# Patient Record
Sex: MALE | Race: WHITE | NOT HISPANIC OR LATINO | Employment: FULL TIME | URBAN - METROPOLITAN AREA
[De-identification: names, ages, dates, MRNs, and addresses within clinical notes are randomized per-mention and may not be internally consistent; named-entity substitution may affect disease eponyms.]

---
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
## 2018-01-02 ENCOUNTER — TELEPHONE (OUTPATIENT)
Dept: PEDIATRICS | Facility: CLINIC | Age: 18
End: 2018-01-02

## 2018-01-02 DIAGNOSIS — R05.9 COUGH: Primary | ICD-10-CM

## 2018-01-02 RX ORDER — ALBUTEROL SULFATE 90 UG/1
2 AEROSOL, METERED RESPIRATORY (INHALATION) EVERY 4 HOURS PRN
Qty: 1 INHALER | Refills: 0 | Status: SHIPPED | OUTPATIENT
Start: 2018-01-02 | End: 2021-03-12

## 2018-01-02 NOTE — TELEPHONE ENCOUNTER
----- Message from Mali Baer sent at 1/2/2018  4:16 PM CST -----  Contact: Mom [phone]  Mom would like to know if Dr. Canales can prescribe Qvar? Mom says he has a terrible cough. Please call and advise.

## 2018-01-02 NOTE — TELEPHONE ENCOUNTER
Spoke with mom and darin. Will resume use of albuterol for short term. Q jorje not likely to help acutely. Cough now for last 2 months. Mom did treat with zithromax. Also discussed possibility of pertussis and cough cough for next month. If not improving see [place] md.

## 2018-01-02 NOTE — TELEPHONE ENCOUNTER
Mom states that pt is home from college. Pt has been coughing,nothing severe, no fever. Given cough medication, reflux medication. Lungs are clear. zpack for 5 days, did not help. Inhaler is . Mom is wanting to know if you can call in another refill until he goes back to college and can see a doctor. Please advise. Allergies/ pharmacy verified.

## 2018-05-10 ENCOUNTER — OFFICE VISIT (OUTPATIENT)
Dept: DERMATOLOGY | Facility: CLINIC | Age: 18
End: 2018-05-10
Payer: COMMERCIAL

## 2018-05-10 DIAGNOSIS — B07.8 VERRUCA PLANA: Primary | ICD-10-CM

## 2018-05-10 PROCEDURE — 99499 UNLISTED E&M SERVICE: CPT | Mod: S$GLB,,, | Performed by: NURSE PRACTITIONER

## 2018-05-10 PROCEDURE — 17110 DESTRUCTION B9 LES UP TO 14: CPT | Mod: S$GLB,,, | Performed by: NURSE PRACTITIONER

## 2018-05-10 PROCEDURE — 99999 PR PBB SHADOW E&M-EST. PATIENT-LVL II: CPT | Mod: PBBFAC,,, | Performed by: NURSE PRACTITIONER

## 2018-05-10 NOTE — PROGRESS NOTES
Subjective:       Patient ID:  Henry Pham is a 17 y.o. male who presents for   Chief Complaint   Patient presents with    Warts     Warts  - Initial  Affected locations: right knee  Duration: 2 months  Signs / symptoms: dryness  Timing: constant  Aggravated by: nothing  Relieving factors/Treatments tried: nothing  Improvement on treatment: no relief        Review of Systems   Constitutional: Negative for fever and chills.   Skin: Positive for activity-related sunscreen use. Negative for daily sunscreen use.   Hematologic/Lymphatic: Does not bruise/bleed easily.        Objective:    Physical Exam   Constitutional: He appears well-developed and well-nourished.   Neurological: He is alert and oriented to person, place, and time.   Psychiatric: He has a normal mood and affect.   Skin:   Areas Examined (abnormalities noted in diagram):   RLE Inspected  LLE Inspection Performed              Diagram Legend     Erythematous scaling macule/papule c/w actinic keratosis       Vascular papule c/w angioma      Pigmented verrucoid papule/plaque c/w seborrheic keratosis      Yellow umbilicated papule c/w sebaceous hyperplasia      Irregularly shaped tan macule c/w lentigo     1-2 mm smooth white papules consistent with Milia      Movable subcutaneous cyst with punctum c/w epidermal inclusion cyst      Subcutaneous movable cyst c/w pilar cyst      Firm pink to brown papule c/w dermatofibroma      Pedunculated fleshy papule(s) c/w skin tag(s)      Evenly pigmented macule c/w junctional nevus     Mildly variegated pigmented, slightly irregular-bordered macule c/w mildly atypical nevus      Flesh colored to evenly pigmented papule c/w intradermal nevus       Pink pearly papule/plaque c/w basal cell carcinoma      Erythematous hyperkeratotic cursted plaque c/w SCC      Surgical scar with no sign of skin cancer recurrence      Open and closed comedones      Inflammatory papules and pustules      Verrucoid papule consistent  consistent with wart     Erythematous eczematous patches and plaques     Dystrophic onycholytic nail with subungual debris c/w onychomycosis     Umbilicated papule    Erythematous-base heme-crusted tan verrucoid plaque consistent with inflamed seborrheic keratosis     Erythematous Silvery Scaling Plaque c/w Psoriasis     See annotation      Assessment / Plan:        Verruca plana- right knee  Procedure note for destruction via shave debulking:    Discussed risks of procedure including but not limited to infection, persistence of lesion, recurrence of lesion, and scar. Verbal consent obtained. Area cleaned with alcohol and anesthetized with 1% lidocaine with epinephrine. 2 Lesion(s) shaved with sharp razor then base destroyed with hyfrecation. No complications.             Follow-up if symptoms worsen or fail to improve.

## 2018-07-03 ENCOUNTER — OFFICE VISIT (OUTPATIENT)
Dept: OPTOMETRY | Facility: CLINIC | Age: 18
End: 2018-07-03
Payer: COMMERCIAL

## 2018-07-03 DIAGNOSIS — H10.212 CHEMICAL CONJUNCTIVITIS OF LEFT EYE: Primary | ICD-10-CM

## 2018-07-03 PROCEDURE — 99999 PR PBB SHADOW E&M-EST. PATIENT-LVL III: CPT | Mod: PBBFAC,,, | Performed by: OPTOMETRIST

## 2018-07-03 PROCEDURE — 92012 INTRM OPH EXAM EST PATIENT: CPT | Mod: S$GLB,,, | Performed by: OPTOMETRIST

## 2018-07-03 RX ORDER — TOBRAMYCIN AND DEXAMETHASONE 3; 1 MG/ML; MG/ML
1 SUSPENSION/ DROPS OPHTHALMIC 4 TIMES DAILY
Qty: 5 ML | Refills: 0 | Status: SHIPPED | OUTPATIENT
Start: 2018-07-03 | End: 2018-07-10

## 2018-07-03 NOTE — PROGRESS NOTES
HPI     DLS: 6/2/2017 with Dr. Cherry   Pt states last night OS was red with mucous discharge. Pt states this   morning redness is light and still have mucous discharge. No problems OD  Denies itching, crusting, and pain. Pt states no va changes    Zaditor ou qhs/bid    Pt is a CL wearer, but did not sleep in CL     Last edited by Tao Li, OD on 7/3/2018 11:18 AM. (History)        ROS     Negative for: Constitutional, Gastrointestinal, Neurological, Skin,   Genitourinary, Musculoskeletal, HENT, Endocrine, Cardiovascular, Eyes,   Respiratory, Psychiatric, Allergic/Imm, Heme/Lymph    Last edited by Tao Li, OD on 7/3/2018 11:18 AM. (History)        Assessment /Plan     For exam results, see Encounter Report.    Chemical conjunctivitis of left eye  -     tobramycin-dexamethasone 0.3-0.1% (TOBRADEX) 0.3-0.1 % DrpS; Place 1 drop into the left eye 4 (four) times daily. for 7 days  Dispense: 5 mL; Refill: 0      1 day hx conj OS.  Pt does wear DAILY DISP CLs a few days a week, but did not wear yetserday or today.  Does not sleep in Cls.  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 TOBRADEX QID OS X 1 week  2. DC CL wear  3. Pt will call if sx persist w tx.  Otherwise may stop drops and return to CL wear when eye is better, and return to Dr cherry as sched for full exam/CLFU  4. Mom present for exam--she is NP who works with Dr Kita Taylor

## 2018-08-13 ENCOUNTER — OFFICE VISIT (OUTPATIENT)
Dept: OPTOMETRY | Facility: CLINIC | Age: 18
End: 2018-08-13
Payer: COMMERCIAL

## 2018-08-13 DIAGNOSIS — Z46.0 FITTING AND ADJUSTMENT OF SPECTACLES AND CONTACT LENSES: ICD-10-CM

## 2018-08-13 DIAGNOSIS — H52.13 MYOPIA OF BOTH EYES: Primary | ICD-10-CM

## 2018-08-13 DIAGNOSIS — H52.13 MYOPIA, BILATERAL: Primary | ICD-10-CM

## 2018-08-13 PROCEDURE — 92015 DETERMINE REFRACTIVE STATE: CPT | Mod: S$GLB,,, | Performed by: OPTOMETRIST

## 2018-08-13 PROCEDURE — 92310 CONTACT LENS FITTING OU: CPT | Mod: S$GLB,,, | Performed by: OPTOMETRIST

## 2018-08-13 PROCEDURE — 99999 PR PBB SHADOW E&M-EST. PATIENT-LVL II: CPT | Mod: PBBFAC,,, | Performed by: OPTOMETRIST

## 2018-08-13 PROCEDURE — 92014 COMPRE OPH EXAM EST PT 1/>: CPT | Mod: S$GLB,,, | Performed by: OPTOMETRIST

## 2018-08-13 NOTE — PROGRESS NOTES
HPI     Pt states that his vision seems to be stable for distance and near since   last exam. Pt denies any signs of flashes or floaters OU at this time. No   pain or discomfort OU. Pt will use zaditor if needed OU. Pt does wear and   believes he sees better when he wears his contacts.     Last edited by Maye Hyde on 8/13/2018  1:02 PM. (History)            Assessment /Plan     For exam results, see Encounter Report.    Myopia, bilateral  Eyeglass Final Rx     Eyeglass Final Rx       Sphere Cylinder Dist VA    Right -2.75 Sphere 20/20    Left -2.75 Sphere 20/20    Type:  SVL    Expiration Date:  8/14/2019              Contact Lens Final Rx     Final Contact Lens Rx       Brand Base Curve Diameter Sphere Cylinder    Right Acuvue 1 Day Moist 8.50 14.2 -2.75 Sphere    Left Acuvue 1 Day Moist 8.50 14.2 -2.75 Sphere    Expiration Date:  8/14/2019    Replacement:  Daily    Wearing Schedule:  Daily wear                  RTC 1 yr

## 2018-11-02 ENCOUNTER — OFFICE VISIT (OUTPATIENT)
Dept: INTERNAL MEDICINE | Facility: CLINIC | Age: 18
End: 2018-11-02
Payer: COMMERCIAL

## 2018-11-02 VITALS
BODY MASS INDEX: 32.25 KG/M2 | DIASTOLIC BLOOD PRESSURE: 88 MMHG | WEIGHT: 238.13 LBS | TEMPERATURE: 98 F | SYSTOLIC BLOOD PRESSURE: 126 MMHG | HEIGHT: 72 IN | HEART RATE: 84 BPM

## 2018-11-02 DIAGNOSIS — R05.3 CHRONIC COUGH: ICD-10-CM

## 2018-11-02 DIAGNOSIS — E66.9 OBESITY (BMI 30-39.9): ICD-10-CM

## 2018-11-02 DIAGNOSIS — Z00.00 ANNUAL PHYSICAL EXAM: Primary | ICD-10-CM

## 2018-11-02 DIAGNOSIS — R09.82 POST-NASAL DRIP: ICD-10-CM

## 2018-11-02 PROCEDURE — 99385 PREV VISIT NEW AGE 18-39: CPT | Mod: S$GLB,,, | Performed by: INTERNAL MEDICINE

## 2018-11-02 PROCEDURE — 99999 PR PBB SHADOW E&M-EST. PATIENT-LVL III: CPT | Mod: PBBFAC,,, | Performed by: INTERNAL MEDICINE

## 2018-11-02 RX ORDER — FLUTICASONE PROPIONATE 50 MCG
2 SPRAY, SUSPENSION (ML) NASAL DAILY
Qty: 16 G | Refills: 12 | Status: SHIPPED | OUTPATIENT
Start: 2018-11-02 | End: 2018-12-02

## 2018-11-02 RX ORDER — LEVOCETIRIZINE DIHYDROCHLORIDE 5 MG/1
5 TABLET, FILM COATED ORAL NIGHTLY
Qty: 30 TABLET | Refills: 11 | Status: SHIPPED | OUTPATIENT
Start: 2018-11-02 | End: 2021-03-12

## 2018-11-02 NOTE — PROGRESS NOTES
"Subjective:       Patient ID: Henry Pham is a 18 y.o. male.    Chief Complaint: Annual Exam (est care;not fasting)    HPI   18 y.o. Male here for annual exam.     Vaccines: Influenza (2018); Tetanus (2009)  Eye exam: 2018    Exercise: no  Diet: regular    Past Medical History:  No date: Allergy      Comment:  seasonal  No date: Asthma, not well controlled  2004: Basal cell carcinoma      Comment:  right shoulder  No date: Constipation - functional  No date: Cough  No date: Obesity (BMI 30-39.9)  No date: Wheezing  Past Surgical History:  No date: TONSILLECTOMY  Social History    Socioeconomic History      Marital status: Single      Spouse name: Not on file      Number of children: Not on file      Years of education: Not on file      Highest education level: Not on file    Social Needs      Financial resource strain: Not on file      Food insecurity - worry: Not on file      Food insecurity - inability: Not on file      Transportation needs - medical: Not on file      Transportation needs - non-medical: Not on file    Occupational History      Not on file    Tobacco Use      Smoking status: Never Smoker      Smokeless tobacco: Never Used    Substance and Sexual Activity      Alcohol use: No      Drug use: No      Sexual activity: Not Currently        Partners: Female    Other Topics      Concerns:        Not on file    Social History Narrative      Preferred name "Marin"      Lives with mom (Dora) and step dad.  Student at [employer]        Review of patient's allergies indicates:  No Known Allergies  Marin Pham had no medications administered during this visit.    Review of Systems   Constitutional: Negative for activity change, appetite change, chills, diaphoresis, fatigue, fever and unexpected weight change.   HENT: Negative for congestion, mouth sores, postnasal drip, rhinorrhea, sinus pressure, sneezing, sore throat, trouble swallowing and voice change.    Eyes: Negative for discharge, itching and " visual disturbance.   Respiratory: Negative for cough, chest tightness, shortness of breath and wheezing.    Cardiovascular: Negative for chest pain, palpitations and leg swelling.   Gastrointestinal: Negative for abdominal pain, blood in stool, constipation, diarrhea, nausea and vomiting.   Endocrine: Negative for cold intolerance and heat intolerance.   Genitourinary: Negative for difficulty urinating, dysuria, flank pain, hematuria and urgency.   Musculoskeletal: Negative for arthralgias, back pain, myalgias and neck pain.   Skin: Negative for rash and wound.   Allergic/Immunologic: Negative for environmental allergies and food allergies.   Neurological: Negative for dizziness, tremors, seizures, syncope, weakness and headaches.   Hematological: Negative for adenopathy. Does not bruise/bleed easily.   Psychiatric/Behavioral: Negative for confusion, sleep disturbance and suicidal ideas. The patient is not nervous/anxious.        Objective:      Physical Exam   Constitutional: He is oriented to person, place, and time. He appears well-developed and well-nourished. No distress.   HENT:   Head: Normocephalic and atraumatic.   Right Ear: External ear normal.   Left Ear: External ear normal.   Nose: Nose normal.   Mouth/Throat: Oropharynx is clear and moist. No oropharyngeal exudate.   Eyes: Conjunctivae and EOM are normal. Pupils are equal, round, and reactive to light. Right eye exhibits no discharge. Left eye exhibits no discharge. No scleral icterus.   Neck: Normal range of motion. Neck supple. No JVD present. No thyromegaly present.   Cardiovascular: Normal rate, regular rhythm, normal heart sounds and intact distal pulses.   No murmur heard.  Pulmonary/Chest: Effort normal and breath sounds normal. No respiratory distress. He has no wheezes. He has no rales.   Abdominal: Soft. Bowel sounds are normal. He exhibits no distension. There is no tenderness. There is no guarding.   Musculoskeletal: He exhibits no edema.    Lymphadenopathy:     He has no cervical adenopathy.   Neurological: He is alert and oriented to person, place, and time. No cranial nerve deficit. Coordination normal.   Skin: Skin is warm and dry. No rash noted. He is not diaphoretic. No pallor.   Psychiatric: He has a normal mood and affect. Judgment normal.       Assessment:       1. Annual physical exam    2. Obesity (BMI 30-39.9)    3. Post-nasal drip    4. Chronic cough        Plan:    1. Blood work ordered        Vaccines: Influenza (2018); Tetanus (2009)       Eye exam: 2018   2. Pt advised on proper diet/exercise for weight loss   3/4. Rx Xyzal/Flonase          If no improvement in the cough, will refer to Pulm    5. F/u in 1 yr

## 2018-11-09 ENCOUNTER — LAB VISIT (OUTPATIENT)
Dept: LAB | Facility: HOSPITAL | Age: 18
End: 2018-11-09
Attending: INTERNAL MEDICINE
Payer: COMMERCIAL

## 2018-11-09 DIAGNOSIS — Z00.00 ANNUAL PHYSICAL EXAM: ICD-10-CM

## 2018-11-09 LAB
ALBUMIN SERPL BCP-MCNC: 4 G/DL
ALP SERPL-CCNC: 79 U/L
ALT SERPL W/O P-5'-P-CCNC: 39 U/L
ANION GAP SERPL CALC-SCNC: 10 MMOL/L
AST SERPL-CCNC: 23 U/L
BASOPHILS # BLD AUTO: 0.07 K/UL
BASOPHILS NFR BLD: 0.7 %
BILIRUB SERPL-MCNC: 0.7 MG/DL
BUN SERPL-MCNC: 12 MG/DL
CALCIUM SERPL-MCNC: 10.1 MG/DL
CHLORIDE SERPL-SCNC: 105 MMOL/L
CHOLEST SERPL-MCNC: 160 MG/DL
CHOLEST/HDLC SERPL: 4.6 {RATIO}
CO2 SERPL-SCNC: 27 MMOL/L
CREAT SERPL-MCNC: 1 MG/DL
DIFFERENTIAL METHOD: ABNORMAL
EOSINOPHIL # BLD AUTO: 0.1 K/UL
EOSINOPHIL NFR BLD: 0.9 %
ERYTHROCYTE [DISTWIDTH] IN BLOOD BY AUTOMATED COUNT: 12.8 %
EST. GFR  (AFRICAN AMERICAN): >60 ML/MIN/1.73 M^2
EST. GFR  (NON AFRICAN AMERICAN): >60 ML/MIN/1.73 M^2
GLUCOSE SERPL-MCNC: 90 MG/DL
HCT VFR BLD AUTO: 47.2 %
HDLC SERPL-MCNC: 35 MG/DL
HDLC SERPL: 21.9 %
HGB BLD-MCNC: 15.1 G/DL
IMM GRANULOCYTES # BLD AUTO: 0.06 K/UL
IMM GRANULOCYTES NFR BLD AUTO: 0.6 %
LDLC SERPL CALC-MCNC: 92 MG/DL
LYMPHOCYTES # BLD AUTO: 3.7 K/UL
LYMPHOCYTES NFR BLD: 34.7 %
MCH RBC QN AUTO: 27.6 PG
MCHC RBC AUTO-ENTMCNC: 32 G/DL
MCV RBC AUTO: 86 FL
MONOCYTES # BLD AUTO: 1.1 K/UL
MONOCYTES NFR BLD: 10.6 %
NEUTROPHILS # BLD AUTO: 5.6 K/UL
NEUTROPHILS NFR BLD: 52.5 %
NONHDLC SERPL-MCNC: 125 MG/DL
NRBC BLD-RTO: 0 /100 WBC
PLATELET # BLD AUTO: 257 K/UL
PMV BLD AUTO: 12.4 FL
POTASSIUM SERPL-SCNC: 4.3 MMOL/L
PROT SERPL-MCNC: 7 G/DL
RBC # BLD AUTO: 5.48 M/UL
SODIUM SERPL-SCNC: 142 MMOL/L
T4 FREE SERPL-MCNC: 0.94 NG/DL
TRIGL SERPL-MCNC: 165 MG/DL
TSH SERPL DL<=0.005 MIU/L-ACNC: 4.42 UIU/ML
WBC # BLD AUTO: 10.59 K/UL

## 2018-11-09 PROCEDURE — 84443 ASSAY THYROID STIM HORMONE: CPT

## 2018-11-09 PROCEDURE — 80053 COMPREHEN METABOLIC PANEL: CPT

## 2018-11-09 PROCEDURE — 80061 LIPID PANEL: CPT

## 2018-11-09 PROCEDURE — 36415 COLL VENOUS BLD VENIPUNCTURE: CPT | Mod: PO

## 2018-11-09 PROCEDURE — 84439 ASSAY OF FREE THYROXINE: CPT

## 2018-11-09 PROCEDURE — 85025 COMPLETE CBC W/AUTO DIFF WBC: CPT

## 2021-01-25 ENCOUNTER — OCCUPATIONAL HEALTH (OUTPATIENT)
Dept: URGENT CARE | Facility: CLINIC | Age: 21
End: 2021-01-25

## 2021-01-25 VITALS — OXYGEN SATURATION: 97 % | HEART RATE: 91 BPM | TEMPERATURE: 97 F

## 2021-01-25 DIAGNOSIS — Z11.59 ENCOUNTER FOR SCREENING FOR OTHER VIRAL DISEASES: Primary | ICD-10-CM

## 2021-01-25 LAB
CTP QC/QA: YES
SARS-COV-2 RDRP RESP QL NAA+PROBE: NEGATIVE

## 2021-01-25 PROCEDURE — 99199 UNLISTED SPECIAL SVC PX/RPRT: CPT | Mod: S$GLB,,, | Performed by: STUDENT IN AN ORGANIZED HEALTH CARE EDUCATION/TRAINING PROGRAM

## 2021-01-25 PROCEDURE — U0002 COVID-19 LAB TEST NON-CDC: HCPCS | Mod: QW,S$GLB,, | Performed by: STUDENT IN AN ORGANIZED HEALTH CARE EDUCATION/TRAINING PROGRAM

## 2021-01-25 PROCEDURE — 99199 CV19 SPECIMEN HANDLING FEE / SWAB: ICD-10-PCS | Mod: S$GLB,,, | Performed by: STUDENT IN AN ORGANIZED HEALTH CARE EDUCATION/TRAINING PROGRAM

## 2021-01-25 PROCEDURE — U0002: ICD-10-PCS | Mod: QW,S$GLB,, | Performed by: STUDENT IN AN ORGANIZED HEALTH CARE EDUCATION/TRAINING PROGRAM

## 2021-03-12 ENCOUNTER — PATIENT MESSAGE (OUTPATIENT)
Dept: INTERNAL MEDICINE | Facility: CLINIC | Age: 21
End: 2021-03-12

## 2021-03-12 ENCOUNTER — OFFICE VISIT (OUTPATIENT)
Dept: INTERNAL MEDICINE | Facility: CLINIC | Age: 21
End: 2021-03-12
Payer: COMMERCIAL

## 2021-03-12 ENCOUNTER — LAB VISIT (OUTPATIENT)
Dept: LAB | Facility: HOSPITAL | Age: 21
End: 2021-03-12
Attending: INTERNAL MEDICINE
Payer: COMMERCIAL

## 2021-03-12 VITALS
SYSTOLIC BLOOD PRESSURE: 122 MMHG | DIASTOLIC BLOOD PRESSURE: 64 MMHG | RESPIRATION RATE: 14 BRPM | HEART RATE: 70 BPM | WEIGHT: 226.88 LBS | TEMPERATURE: 97 F | HEIGHT: 72 IN | BODY MASS INDEX: 30.73 KG/M2

## 2021-03-12 DIAGNOSIS — Z00.00 ANNUAL PHYSICAL EXAM: Primary | ICD-10-CM

## 2021-03-12 DIAGNOSIS — Z00.00 ANNUAL PHYSICAL EXAM: ICD-10-CM

## 2021-03-12 LAB
ALBUMIN SERPL BCP-MCNC: 4.2 G/DL (ref 3.5–5.2)
ALP SERPL-CCNC: 100 U/L (ref 55–135)
ALT SERPL W/O P-5'-P-CCNC: 20 U/L (ref 10–44)
ANION GAP SERPL CALC-SCNC: 10 MMOL/L (ref 8–16)
AST SERPL-CCNC: 16 U/L (ref 10–40)
BASOPHILS # BLD AUTO: 0.03 K/UL (ref 0–0.2)
BASOPHILS NFR BLD: 0.4 % (ref 0–1.9)
BILIRUB SERPL-MCNC: 0.5 MG/DL (ref 0.1–1)
BUN SERPL-MCNC: 20 MG/DL (ref 6–20)
CALCIUM SERPL-MCNC: 9.6 MG/DL (ref 8.7–10.5)
CHLORIDE SERPL-SCNC: 106 MMOL/L (ref 95–110)
CHOLEST SERPL-MCNC: 138 MG/DL (ref 120–199)
CHOLEST/HDLC SERPL: 4.2 {RATIO} (ref 2–5)
CO2 SERPL-SCNC: 25 MMOL/L (ref 23–29)
CREAT SERPL-MCNC: 1 MG/DL (ref 0.5–1.4)
DIFFERENTIAL METHOD: NORMAL
EOSINOPHIL # BLD AUTO: 0.1 K/UL (ref 0–0.5)
EOSINOPHIL NFR BLD: 1.2 % (ref 0–8)
ERYTHROCYTE [DISTWIDTH] IN BLOOD BY AUTOMATED COUNT: 13.3 % (ref 11.5–14.5)
EST. GFR  (AFRICAN AMERICAN): >60 ML/MIN/1.73 M^2
EST. GFR  (NON AFRICAN AMERICAN): >60 ML/MIN/1.73 M^2
GLUCOSE SERPL-MCNC: 90 MG/DL (ref 70–110)
HCT VFR BLD AUTO: 45.9 % (ref 40–54)
HDLC SERPL-MCNC: 33 MG/DL (ref 40–75)
HDLC SERPL: 23.9 % (ref 20–50)
HGB BLD-MCNC: 15.1 G/DL (ref 14–18)
IMM GRANULOCYTES # BLD AUTO: 0.02 K/UL (ref 0–0.04)
IMM GRANULOCYTES NFR BLD AUTO: 0.2 % (ref 0–0.5)
LDLC SERPL CALC-MCNC: 83.2 MG/DL (ref 63–159)
LYMPHOCYTES # BLD AUTO: 2.5 K/UL (ref 1–4.8)
LYMPHOCYTES NFR BLD: 30.8 % (ref 18–48)
MCH RBC QN AUTO: 27.7 PG (ref 27–31)
MCHC RBC AUTO-ENTMCNC: 32.9 G/DL (ref 32–36)
MCV RBC AUTO: 84 FL (ref 82–98)
MONOCYTES # BLD AUTO: 0.9 K/UL (ref 0.3–1)
MONOCYTES NFR BLD: 10.5 % (ref 4–15)
NEUTROPHILS # BLD AUTO: 4.6 K/UL (ref 1.8–7.7)
NEUTROPHILS NFR BLD: 56.9 % (ref 38–73)
NONHDLC SERPL-MCNC: 105 MG/DL
NRBC BLD-RTO: 0 /100 WBC
PLATELET # BLD AUTO: 244 K/UL (ref 150–350)
PMV BLD AUTO: 12.2 FL (ref 9.2–12.9)
POTASSIUM SERPL-SCNC: 4 MMOL/L (ref 3.5–5.1)
PROT SERPL-MCNC: 7 G/DL (ref 6–8.4)
RBC # BLD AUTO: 5.46 M/UL (ref 4.6–6.2)
SODIUM SERPL-SCNC: 141 MMOL/L (ref 136–145)
T4 FREE SERPL-MCNC: 0.91 NG/DL (ref 0.71–1.51)
TRIGL SERPL-MCNC: 109 MG/DL (ref 30–150)
TSH SERPL DL<=0.005 MIU/L-ACNC: 6.33 UIU/ML (ref 0.4–4)
WBC # BLD AUTO: 8.06 K/UL (ref 3.9–12.7)

## 2021-03-12 PROCEDURE — 85025 COMPLETE CBC W/AUTO DIFF WBC: CPT | Performed by: INTERNAL MEDICINE

## 2021-03-12 PROCEDURE — 84439 ASSAY OF FREE THYROXINE: CPT | Performed by: INTERNAL MEDICINE

## 2021-03-12 PROCEDURE — 80061 LIPID PANEL: CPT | Performed by: INTERNAL MEDICINE

## 2021-03-12 PROCEDURE — 99395 PREV VISIT EST AGE 18-39: CPT | Mod: S$GLB,,, | Performed by: INTERNAL MEDICINE

## 2021-03-12 PROCEDURE — 99395 PR PREVENTIVE VISIT,EST,18-39: ICD-10-PCS | Mod: S$GLB,,, | Performed by: INTERNAL MEDICINE

## 2021-03-12 PROCEDURE — 99213 OFFICE O/P EST LOW 20 MIN: CPT | Mod: PBBFAC,PO | Performed by: INTERNAL MEDICINE

## 2021-03-12 PROCEDURE — 80053 COMPREHEN METABOLIC PANEL: CPT | Performed by: INTERNAL MEDICINE

## 2021-03-12 PROCEDURE — 99999 PR PBB SHADOW E&M-EST. PATIENT-LVL III: CPT | Mod: PBBFAC,,, | Performed by: INTERNAL MEDICINE

## 2021-03-12 PROCEDURE — 99999 PR PBB SHADOW E&M-EST. PATIENT-LVL III: ICD-10-PCS | Mod: PBBFAC,,, | Performed by: INTERNAL MEDICINE

## 2021-03-12 PROCEDURE — 84443 ASSAY THYROID STIM HORMONE: CPT | Performed by: INTERNAL MEDICINE

## 2021-03-12 PROCEDURE — 36415 COLL VENOUS BLD VENIPUNCTURE: CPT | Performed by: INTERNAL MEDICINE

## 2021-03-15 ENCOUNTER — TELEPHONE (OUTPATIENT)
Dept: INTERNAL MEDICINE | Facility: CLINIC | Age: 21
End: 2021-03-15

## 2021-03-15 DIAGNOSIS — E03.9 HYPOTHYROIDISM, UNSPECIFIED TYPE: Primary | ICD-10-CM

## 2021-03-15 RX ORDER — LEVOTHYROXINE SODIUM 50 UG/1
50 TABLET ORAL
Qty: 30 TABLET | Refills: 1 | Status: SHIPPED | OUTPATIENT
Start: 2021-03-15 | End: 2021-04-12 | Stop reason: SDUPTHER

## 2021-03-24 ENCOUNTER — IMMUNIZATION (OUTPATIENT)
Dept: PRIMARY CARE CLINIC | Facility: CLINIC | Age: 21
End: 2021-03-24
Payer: COMMERCIAL

## 2021-03-24 DIAGNOSIS — Z23 NEED FOR VACCINATION: Primary | ICD-10-CM

## 2021-03-24 PROCEDURE — 0001A COVID-19, MRNA, LNP-S, PF, 30 MCG/0.3 ML DOSE VACCINE: ICD-10-PCS | Mod: CV19,S$GLB,, | Performed by: INTERNAL MEDICINE

## 2021-03-24 PROCEDURE — 91300 COVID-19, MRNA, LNP-S, PF, 30 MCG/0.3 ML DOSE VACCINE: ICD-10-PCS | Mod: S$GLB,,, | Performed by: INTERNAL MEDICINE

## 2021-03-24 PROCEDURE — 0001A COVID-19, MRNA, LNP-S, PF, 30 MCG/0.3 ML DOSE VACCINE: CPT | Mod: CV19,S$GLB,, | Performed by: INTERNAL MEDICINE

## 2021-03-24 PROCEDURE — 91300 COVID-19, MRNA, LNP-S, PF, 30 MCG/0.3 ML DOSE VACCINE: CPT | Mod: S$GLB,,, | Performed by: INTERNAL MEDICINE

## 2021-04-12 RX ORDER — LEVOTHYROXINE SODIUM 50 UG/1
50 TABLET ORAL
Qty: 30 TABLET | Refills: 0 | Status: SHIPPED | OUTPATIENT
Start: 2021-04-12 | End: 2021-05-02 | Stop reason: SDUPTHER

## 2021-04-14 ENCOUNTER — IMMUNIZATION (OUTPATIENT)
Dept: PRIMARY CARE CLINIC | Facility: CLINIC | Age: 21
End: 2021-04-14
Payer: COMMERCIAL

## 2021-04-14 DIAGNOSIS — Z23 NEED FOR VACCINATION: Primary | ICD-10-CM

## 2021-04-14 PROCEDURE — 91300 COVID-19, MRNA, LNP-S, PF, 30 MCG/0.3 ML DOSE VACCINE: CPT | Mod: S$GLB,,, | Performed by: INTERNAL MEDICINE

## 2021-04-14 PROCEDURE — 0002A COVID-19, MRNA, LNP-S, PF, 30 MCG/0.3 ML DOSE VACCINE: ICD-10-PCS | Mod: CV19,S$GLB,, | Performed by: INTERNAL MEDICINE

## 2021-04-14 PROCEDURE — 0002A COVID-19, MRNA, LNP-S, PF, 30 MCG/0.3 ML DOSE VACCINE: CPT | Mod: CV19,S$GLB,, | Performed by: INTERNAL MEDICINE

## 2021-04-14 PROCEDURE — 91300 COVID-19, MRNA, LNP-S, PF, 30 MCG/0.3 ML DOSE VACCINE: ICD-10-PCS | Mod: S$GLB,,, | Performed by: INTERNAL MEDICINE

## 2021-04-26 ENCOUNTER — LAB VISIT (OUTPATIENT)
Dept: LAB | Facility: HOSPITAL | Age: 21
End: 2021-04-26
Attending: INTERNAL MEDICINE
Payer: COMMERCIAL

## 2021-04-26 DIAGNOSIS — E03.9 HYPOTHYROIDISM, UNSPECIFIED TYPE: ICD-10-CM

## 2021-04-26 LAB
T4 FREE SERPL-MCNC: 0.99 NG/DL (ref 0.71–1.51)
TSH SERPL DL<=0.005 MIU/L-ACNC: 3.25 UIU/ML (ref 0.4–4)

## 2021-04-26 PROCEDURE — 84439 ASSAY OF FREE THYROXINE: CPT | Performed by: INTERNAL MEDICINE

## 2021-04-26 PROCEDURE — 84443 ASSAY THYROID STIM HORMONE: CPT | Performed by: INTERNAL MEDICINE

## 2021-04-26 PROCEDURE — 36415 COLL VENOUS BLD VENIPUNCTURE: CPT | Mod: PO | Performed by: INTERNAL MEDICINE

## 2021-04-27 ENCOUNTER — TELEPHONE (OUTPATIENT)
Dept: INTERNAL MEDICINE | Facility: CLINIC | Age: 21
End: 2021-04-27

## 2021-05-03 RX ORDER — LEVOTHYROXINE SODIUM 50 UG/1
50 TABLET ORAL
Qty: 90 TABLET | Refills: 3 | Status: SHIPPED | OUTPATIENT
Start: 2021-05-03 | End: 2021-08-09 | Stop reason: SDUPTHER

## 2021-08-10 RX ORDER — LEVOTHYROXINE SODIUM 50 UG/1
50 TABLET ORAL
Qty: 90 TABLET | Refills: 3 | Status: SHIPPED | OUTPATIENT
Start: 2021-08-10 | End: 2021-11-09 | Stop reason: SDUPTHER

## 2021-09-30 ENCOUNTER — OFFICE VISIT (OUTPATIENT)
Dept: OPTOMETRY | Facility: CLINIC | Age: 21
End: 2021-09-30
Payer: COMMERCIAL

## 2021-09-30 DIAGNOSIS — H52.13 MYOPIA, BILATERAL: Primary | ICD-10-CM

## 2021-09-30 PROBLEM — Z97.3 WEARS CONTACT LENSES: Status: ACTIVE | Noted: 2021-09-30

## 2021-09-30 PROCEDURE — 92004 COMPRE OPH EXAM NEW PT 1/>: CPT | Mod: ,,, | Performed by: OPTOMETRIST

## 2021-09-30 PROCEDURE — 99999 PR PBB SHADOW E&M-EST. PATIENT-LVL II: CPT | Mod: PBBFAC,,, | Performed by: OPTOMETRIST

## 2021-09-30 PROCEDURE — 99999 PR PBB SHADOW E&M-EST. PATIENT-LVL II: ICD-10-PCS | Mod: PBBFAC,,, | Performed by: OPTOMETRIST

## 2021-09-30 PROCEDURE — 92015 PR REFRACTION: ICD-10-PCS | Mod: S$GLB,,, | Performed by: OPTOMETRIST

## 2021-09-30 PROCEDURE — 92004 PR EYE EXAM, NEW PATIENT,COMPREHESV: ICD-10-PCS | Mod: ,,, | Performed by: OPTOMETRIST

## 2021-09-30 PROCEDURE — 92015 DETERMINE REFRACTIVE STATE: CPT | Mod: S$GLB,,, | Performed by: OPTOMETRIST

## 2021-11-09 RX ORDER — LEVOTHYROXINE SODIUM 50 UG/1
50 TABLET ORAL
Qty: 90 TABLET | Refills: 3 | Status: SHIPPED | OUTPATIENT
Start: 2021-11-09 | End: 2021-11-29

## 2021-11-27 ENCOUNTER — PATIENT MESSAGE (OUTPATIENT)
Dept: INTERNAL MEDICINE | Facility: CLINIC | Age: 21
End: 2021-11-27

## 2021-11-27 ENCOUNTER — LAB VISIT (OUTPATIENT)
Dept: LAB | Facility: HOSPITAL | Age: 21
End: 2021-11-27
Attending: INTERNAL MEDICINE
Payer: COMMERCIAL

## 2021-11-27 ENCOUNTER — OFFICE VISIT (OUTPATIENT)
Dept: INTERNAL MEDICINE | Facility: CLINIC | Age: 21
End: 2021-11-27
Payer: COMMERCIAL

## 2021-11-27 VITALS
HEART RATE: 69 BPM | BODY MASS INDEX: 27.86 KG/M2 | OXYGEN SATURATION: 99 % | DIASTOLIC BLOOD PRESSURE: 80 MMHG | RESPIRATION RATE: 18 BRPM | WEIGHT: 205.69 LBS | HEIGHT: 72 IN | TEMPERATURE: 98 F | SYSTOLIC BLOOD PRESSURE: 114 MMHG

## 2021-11-27 DIAGNOSIS — E03.9 HYPOTHYROIDISM, UNSPECIFIED TYPE: ICD-10-CM

## 2021-11-27 DIAGNOSIS — E03.9 HYPOTHYROIDISM, UNSPECIFIED TYPE: Primary | ICD-10-CM

## 2021-11-27 LAB
T4 FREE SERPL-MCNC: 0.98 NG/DL (ref 0.71–1.51)
TSH SERPL DL<=0.005 MIU/L-ACNC: 4.46 UIU/ML (ref 0.4–4)

## 2021-11-27 PROCEDURE — 99214 OFFICE O/P EST MOD 30 MIN: CPT | Mod: S$GLB,,, | Performed by: INTERNAL MEDICINE

## 2021-11-27 PROCEDURE — 36415 COLL VENOUS BLD VENIPUNCTURE: CPT | Mod: PO | Performed by: INTERNAL MEDICINE

## 2021-11-27 PROCEDURE — 99999 PR PBB SHADOW E&M-EST. PATIENT-LVL III: CPT | Mod: PBBFAC,,, | Performed by: INTERNAL MEDICINE

## 2021-11-27 PROCEDURE — 99214 PR OFFICE/OUTPT VISIT, EST, LEVL IV, 30-39 MIN: ICD-10-PCS | Mod: S$GLB,,, | Performed by: INTERNAL MEDICINE

## 2021-11-27 PROCEDURE — 84439 ASSAY OF FREE THYROXINE: CPT | Performed by: INTERNAL MEDICINE

## 2021-11-27 PROCEDURE — 84443 ASSAY THYROID STIM HORMONE: CPT | Performed by: INTERNAL MEDICINE

## 2021-11-27 PROCEDURE — 99999 PR PBB SHADOW E&M-EST. PATIENT-LVL III: ICD-10-PCS | Mod: PBBFAC,,, | Performed by: INTERNAL MEDICINE

## 2021-11-28 ENCOUNTER — PATIENT MESSAGE (OUTPATIENT)
Dept: INTERNAL MEDICINE | Facility: CLINIC | Age: 21
End: 2021-11-28
Payer: COMMERCIAL

## 2021-11-29 ENCOUNTER — TELEPHONE (OUTPATIENT)
Dept: INTERNAL MEDICINE | Facility: CLINIC | Age: 21
End: 2021-11-29
Payer: COMMERCIAL

## 2021-11-29 DIAGNOSIS — E03.9 HYPOTHYROIDISM, UNSPECIFIED TYPE: Primary | ICD-10-CM

## 2021-11-29 RX ORDER — LEVOTHYROXINE SODIUM 75 UG/1
75 TABLET ORAL
Qty: 30 TABLET | Refills: 1 | Status: SHIPPED | OUTPATIENT
Start: 2021-11-29 | End: 2022-01-11

## 2022-01-10 ENCOUNTER — PATIENT MESSAGE (OUTPATIENT)
Dept: INTERNAL MEDICINE | Facility: CLINIC | Age: 22
End: 2022-01-10
Payer: COMMERCIAL

## 2022-01-10 ENCOUNTER — LAB VISIT (OUTPATIENT)
Dept: LAB | Facility: HOSPITAL | Age: 22
End: 2022-01-10
Attending: INTERNAL MEDICINE
Payer: COMMERCIAL

## 2022-01-10 DIAGNOSIS — E03.9 HYPOTHYROIDISM, UNSPECIFIED TYPE: ICD-10-CM

## 2022-01-10 LAB
T4 FREE SERPL-MCNC: 1.03 NG/DL (ref 0.71–1.51)
TSH SERPL DL<=0.005 MIU/L-ACNC: 6.37 UIU/ML (ref 0.4–4)

## 2022-01-10 PROCEDURE — 36415 COLL VENOUS BLD VENIPUNCTURE: CPT | Mod: PO | Performed by: INTERNAL MEDICINE

## 2022-01-10 PROCEDURE — 84439 ASSAY OF FREE THYROXINE: CPT | Performed by: INTERNAL MEDICINE

## 2022-01-10 PROCEDURE — 84443 ASSAY THYROID STIM HORMONE: CPT | Performed by: INTERNAL MEDICINE

## 2022-01-11 ENCOUNTER — TELEPHONE (OUTPATIENT)
Dept: INTERNAL MEDICINE | Facility: CLINIC | Age: 22
End: 2022-01-11
Payer: COMMERCIAL

## 2022-01-11 DIAGNOSIS — E03.9 HYPOTHYROIDISM, UNSPECIFIED TYPE: Primary | ICD-10-CM

## 2022-01-11 RX ORDER — LEVOTHYROXINE SODIUM 100 UG/1
100 TABLET ORAL
Qty: 30 TABLET | Refills: 11 | Status: SHIPPED | OUTPATIENT
Start: 2022-01-11 | End: 2022-04-18 | Stop reason: SDUPTHER

## 2022-01-14 NOTE — TELEPHONE ENCOUNTER
No new care gaps identified.  Powered by [employer]. Reference number: [id number].   1/13/2022 6:35:25 PM CST

## 2022-01-17 RX ORDER — LEVOTHYROXINE SODIUM 100 UG/1
100 TABLET ORAL
Qty: 30 TABLET | Refills: 11 | OUTPATIENT
Start: 2022-01-17 | End: 2023-01-17

## 2022-01-17 NOTE — TELEPHONE ENCOUNTER
Quick DC. Request already responded to by other means (e.g. phone or fax)   Refill Authorization Note   Henry Pham  is requesting a refill authorization.  Brief Assessment and Rationale for Refill:  Quick Discontinue  Medication Therapy Plan:       Medication Reconciliation Completed:  No      Comments:   Pended Medication(s)       Requested Prescriptions     Pending Prescriptions Disp Refills    levothyroxine (SYNTHROID) 100 MCG tablet 30 tablet 11     Sig: Take 1 tablet (100 mcg total) by mouth before breakfast.        Duplicate Pended Encounter(s)/ Last Prescribed Details: (includes pharmacy & prescriber details)   Ordering User:  Tao Boyer,                  Pharmacy     [id number] - [place] - [address]   [address], [place] [address]   Phone:  [phone]  Fax:  [phone]   CALE #:  [id number]   EDUARDO Reason: --         Outpatient Medication Detail       Disp Refills Start End EDUARDO   levothyroxine (SYNTHROID) 100 MCG tablet 30 tablet 11 1/11/2022 1/11/2023 --   Sig - Route: Take 1 tablet (100 mcg total) by mouth before breakfast. - Oral   Sent to pharmacy as: levothyroxine (SYNTHROID) 100 MCG tablet   Class: Normal   Order: [id number]   Date/Time Signed: 1/11/2022 11:10           E-Prescribing Status: Receipt confirmed by pharmacy (1/11/2022 11:11 AM CST)      Ordering Encounter Report     Associated Reports   View Encounter                    Note composed:8:35 AM 01/17/2022

## 2022-01-17 NOTE — TELEPHONE ENCOUNTER
Quick DC. Request already responded to by other means (e.g. phone or fax)   Refill Authorization Note   Henry Pham  is requesting a refill authorization.  Brief Assessment and Rationale for Refill:  Quick Discontinue  Medication Therapy Plan:       Medication Reconciliation Completed:  No      Comments:   Pended Medication(s)       Requested Prescriptions     Pending Prescriptions Disp Refills    levothyroxine (SYNTHROID) 100 MCG tablet 30 tablet 11     Sig: Take 1 tablet (100 mcg total) by mouth before breakfast.        Duplicate Pended Encounter(s)/ Last Prescribed Details: (includes pharmacy & prescriber details)   Ordering User:  Tao Boyer,             Pharmacy    [id number] - [place] - [address]   [address], [place] [address]   Phone:  [phone]  Fax:  [phone]   CALE #:  [id number]   EDUARDO Reason: --       Outpatient Medication Detail     Disp Refills Start End EDUARDO   levothyroxine (SYNTHROID) 100 MCG tablet 30 tablet 11 1/11/2022 1/11/2023 --   Sig - Route: Take 1 tablet (100 mcg total) by mouth before breakfast. - Oral   Sent to pharmacy as: levothyroxine (SYNTHROID) 100 MCG tablet   Class: Normal   Order: [id number]   Date/Time Signed: 1/11/2022 11:10       E-Prescribing Status: Receipt confirmed by pharmacy (1/11/2022 11:11 AM CST)     Ordering Encounter Report    Associated Reports   View Encounter            Note composed:8:08 AM 01/17/2022

## 2022-02-22 ENCOUNTER — LAB VISIT (OUTPATIENT)
Dept: LAB | Facility: HOSPITAL | Age: 22
End: 2022-02-22
Attending: INTERNAL MEDICINE
Payer: COMMERCIAL

## 2022-02-22 DIAGNOSIS — E03.9 HYPOTHYROIDISM, UNSPECIFIED TYPE: ICD-10-CM

## 2022-02-22 LAB
T4 FREE SERPL-MCNC: 1.17 NG/DL (ref 0.71–1.51)
TSH SERPL DL<=0.005 MIU/L-ACNC: 1.25 UIU/ML (ref 0.4–4)

## 2022-02-22 PROCEDURE — 84443 ASSAY THYROID STIM HORMONE: CPT | Performed by: INTERNAL MEDICINE

## 2022-02-22 PROCEDURE — 84439 ASSAY OF FREE THYROXINE: CPT | Performed by: INTERNAL MEDICINE

## 2022-02-22 PROCEDURE — 36415 COLL VENOUS BLD VENIPUNCTURE: CPT | Mod: PO | Performed by: INTERNAL MEDICINE

## 2022-04-18 RX ORDER — LEVOTHYROXINE SODIUM 100 UG/1
100 TABLET ORAL
Qty: 90 TABLET | Refills: 3 | Status: SHIPPED | OUTPATIENT
Start: 2022-04-18 | End: 2022-07-18 | Stop reason: SDUPTHER

## 2022-04-18 NOTE — TELEPHONE ENCOUNTER
No new care gaps identified.  Powered by [employer]. Reference number: [id number].   4/18/2022 10:02:16 AM CDT

## 2022-04-22 ENCOUNTER — CLINICAL SUPPORT (OUTPATIENT)
Dept: OTHER | Facility: CLINIC | Age: 22
End: 2022-04-22

## 2022-04-22 DIAGNOSIS — Z00.8 ENCOUNTER FOR OTHER GENERAL EXAMINATION: ICD-10-CM

## 2022-04-25 VITALS
BODY MASS INDEX: 29.26 KG/M2 | DIASTOLIC BLOOD PRESSURE: 82 MMHG | WEIGHT: 209 LBS | HEIGHT: 71 IN | SYSTOLIC BLOOD PRESSURE: 115 MMHG

## 2022-04-25 LAB
HDLC SERPL-MCNC: 33 MG/DL
POC CHOLESTEROL, LDL (DOCK): 77.93 MG/DL
POC CHOLESTEROL, TOTAL: 127 MG/DL
POC GLUCOSE, FASTING: 81 MG/DL (ref 60–110)
TRIGL SERPL-MCNC: 81 MG/DL

## 2022-10-19 ENCOUNTER — TELEPHONE (OUTPATIENT)
Dept: INTERNAL MEDICINE | Facility: CLINIC | Age: 22
End: 2022-10-19
Payer: COMMERCIAL

## 2022-10-19 RX ORDER — LEVOTHYROXINE SODIUM 100 UG/1
100 TABLET ORAL
Qty: 90 TABLET | Refills: 3 | Status: CANCELLED | OUTPATIENT
Start: 2022-10-19 | End: 2023-10-19

## 2022-10-19 NOTE — TELEPHONE ENCOUNTER
Care Due:                  Date            Visit Type   Department     Provider  --------------------------------------------------------------------------------                                EP -                              PRIMARY      MET INTERNAL  Last Visit: 11-      CARE ([facility])   MEDICINE       Tao Boyer  Next Visit: None Scheduled  None         None Found                                                            Last  Test          Frequency    Reason                     Performed    Due Date  --------------------------------------------------------------------------------    Office Visit  12 months..  levothyroxine............  11- 11-    [facility] Embedded Care Gaps. Reference number: [id number]. 10/19/2022   9:09:22 AM CDT

## 2023-03-23 ENCOUNTER — LAB VISIT (OUTPATIENT)
Dept: LAB | Facility: HOSPITAL | Age: 23
End: 2023-03-23
Attending: INTERNAL MEDICINE
Payer: COMMERCIAL

## 2023-03-23 ENCOUNTER — OFFICE VISIT (OUTPATIENT)
Dept: INTERNAL MEDICINE | Facility: CLINIC | Age: 23
End: 2023-03-23
Payer: COMMERCIAL

## 2023-03-23 VITALS
HEART RATE: 82 BPM | BODY MASS INDEX: 33.87 KG/M2 | OXYGEN SATURATION: 97 % | SYSTOLIC BLOOD PRESSURE: 124 MMHG | HEIGHT: 71 IN | DIASTOLIC BLOOD PRESSURE: 82 MMHG | RESPIRATION RATE: 20 BRPM | WEIGHT: 241.94 LBS | TEMPERATURE: 97 F

## 2023-03-23 DIAGNOSIS — Z00.00 ANNUAL PHYSICAL EXAM: ICD-10-CM

## 2023-03-23 DIAGNOSIS — E03.9 HYPOTHYROIDISM, UNSPECIFIED TYPE: ICD-10-CM

## 2023-03-23 DIAGNOSIS — Z00.00 ANNUAL PHYSICAL EXAM: Primary | ICD-10-CM

## 2023-03-23 LAB
ALBUMIN SERPL BCP-MCNC: 3.9 G/DL (ref 3.5–5.2)
ALP SERPL-CCNC: 73 U/L (ref 55–135)
ALT SERPL W/O P-5'-P-CCNC: 21 U/L (ref 10–44)
ANION GAP SERPL CALC-SCNC: 10 MMOL/L (ref 8–16)
AST SERPL-CCNC: 17 U/L (ref 10–40)
BASOPHILS # BLD AUTO: 0.04 K/UL (ref 0–0.2)
BASOPHILS NFR BLD: 0.5 % (ref 0–1.9)
BILIRUB SERPL-MCNC: 0.4 MG/DL (ref 0.1–1)
BUN SERPL-MCNC: 11 MG/DL (ref 6–20)
CALCIUM SERPL-MCNC: 9.3 MG/DL (ref 8.7–10.5)
CHLORIDE SERPL-SCNC: 106 MMOL/L (ref 95–110)
CHOLEST SERPL-MCNC: 158 MG/DL (ref 120–199)
CHOLEST/HDLC SERPL: 3.8 {RATIO} (ref 2–5)
CO2 SERPL-SCNC: 23 MMOL/L (ref 23–29)
CREAT SERPL-MCNC: 1 MG/DL (ref 0.5–1.4)
DIFFERENTIAL METHOD: NORMAL
EOSINOPHIL # BLD AUTO: 0.1 K/UL (ref 0–0.5)
EOSINOPHIL NFR BLD: 0.7 % (ref 0–8)
ERYTHROCYTE [DISTWIDTH] IN BLOOD BY AUTOMATED COUNT: 12.4 % (ref 11.5–14.5)
EST. GFR  (NO RACE VARIABLE): >60 ML/MIN/1.73 M^2
ESTIMATED AVG GLUCOSE: 103 MG/DL (ref 68–131)
GLUCOSE SERPL-MCNC: 79 MG/DL (ref 70–110)
HBA1C MFR BLD: 5.2 % (ref 4–5.6)
HCT VFR BLD AUTO: 44.8 % (ref 40–54)
HDLC SERPL-MCNC: 42 MG/DL (ref 40–75)
HDLC SERPL: 26.6 % (ref 20–50)
HGB BLD-MCNC: 14.8 G/DL (ref 14–18)
IMM GRANULOCYTES # BLD AUTO: 0.03 K/UL (ref 0–0.04)
IMM GRANULOCYTES NFR BLD AUTO: 0.4 % (ref 0–0.5)
LDLC SERPL CALC-MCNC: 94.4 MG/DL (ref 63–159)
LYMPHOCYTES # BLD AUTO: 2.8 K/UL (ref 1–4.8)
LYMPHOCYTES NFR BLD: 34.4 % (ref 18–48)
MCH RBC QN AUTO: 28 PG (ref 27–31)
MCHC RBC AUTO-ENTMCNC: 33 G/DL (ref 32–36)
MCV RBC AUTO: 85 FL (ref 82–98)
MONOCYTES # BLD AUTO: 0.8 K/UL (ref 0.3–1)
MONOCYTES NFR BLD: 9.6 % (ref 4–15)
NEUTROPHILS # BLD AUTO: 4.4 K/UL (ref 1.8–7.7)
NEUTROPHILS NFR BLD: 54.4 % (ref 38–73)
NONHDLC SERPL-MCNC: 116 MG/DL
NRBC BLD-RTO: 0 /100 WBC
PLATELET # BLD AUTO: 250 K/UL (ref 150–450)
PMV BLD AUTO: 11.6 FL (ref 9.2–12.9)
POTASSIUM SERPL-SCNC: 4.1 MMOL/L (ref 3.5–5.1)
PROT SERPL-MCNC: 6.7 G/DL (ref 6–8.4)
RBC # BLD AUTO: 5.29 M/UL (ref 4.6–6.2)
SODIUM SERPL-SCNC: 139 MMOL/L (ref 136–145)
T4 FREE SERPL-MCNC: 1.04 NG/DL (ref 0.71–1.51)
TRIGL SERPL-MCNC: 108 MG/DL (ref 30–150)
TSH SERPL DL<=0.005 MIU/L-ACNC: 2.64 UIU/ML (ref 0.4–4)
WBC # BLD AUTO: 8.09 K/UL (ref 3.9–12.7)

## 2023-03-23 PROCEDURE — 3079F PR MOST RECENT DIASTOLIC BLOOD PRESSURE 80-89 MM HG: ICD-10-PCS | Mod: CPTII,S$GLB,, | Performed by: INTERNAL MEDICINE

## 2023-03-23 PROCEDURE — 99395 PR PREVENTIVE VISIT,EST,18-39: ICD-10-PCS | Mod: S$GLB,,, | Performed by: INTERNAL MEDICINE

## 2023-03-23 PROCEDURE — 1159F PR MEDICATION LIST DOCUMENTED IN MEDICAL RECORD: ICD-10-PCS | Mod: CPTII,S$GLB,, | Performed by: INTERNAL MEDICINE

## 2023-03-23 PROCEDURE — 3074F PR MOST RECENT SYSTOLIC BLOOD PRESSURE < 130 MM HG: ICD-10-PCS | Mod: CPTII,S$GLB,, | Performed by: INTERNAL MEDICINE

## 2023-03-23 PROCEDURE — 3008F PR BODY MASS INDEX (BMI) DOCUMENTED: ICD-10-PCS | Mod: CPTII,S$GLB,, | Performed by: INTERNAL MEDICINE

## 2023-03-23 PROCEDURE — 1160F RVW MEDS BY RX/DR IN RCRD: CPT | Mod: CPTII,S$GLB,, | Performed by: INTERNAL MEDICINE

## 2023-03-23 PROCEDURE — 3008F BODY MASS INDEX DOCD: CPT | Mod: CPTII,S$GLB,, | Performed by: INTERNAL MEDICINE

## 2023-03-23 PROCEDURE — 99999 PR PBB SHADOW E&M-EST. PATIENT-LVL III: CPT | Mod: PBBFAC,,, | Performed by: INTERNAL MEDICINE

## 2023-03-23 PROCEDURE — 36415 COLL VENOUS BLD VENIPUNCTURE: CPT | Mod: PO | Performed by: INTERNAL MEDICINE

## 2023-03-23 PROCEDURE — 80061 LIPID PANEL: CPT | Performed by: INTERNAL MEDICINE

## 2023-03-23 PROCEDURE — 85025 COMPLETE CBC W/AUTO DIFF WBC: CPT | Performed by: INTERNAL MEDICINE

## 2023-03-23 PROCEDURE — 3079F DIAST BP 80-89 MM HG: CPT | Mod: CPTII,S$GLB,, | Performed by: INTERNAL MEDICINE

## 2023-03-23 PROCEDURE — 80053 COMPREHEN METABOLIC PANEL: CPT | Performed by: INTERNAL MEDICINE

## 2023-03-23 PROCEDURE — 83036 HEMOGLOBIN GLYCOSYLATED A1C: CPT | Performed by: INTERNAL MEDICINE

## 2023-03-23 PROCEDURE — 1159F MED LIST DOCD IN RCRD: CPT | Mod: CPTII,S$GLB,, | Performed by: INTERNAL MEDICINE

## 2023-03-23 PROCEDURE — 84439 ASSAY OF FREE THYROXINE: CPT | Performed by: INTERNAL MEDICINE

## 2023-03-23 PROCEDURE — 3074F SYST BP LT 130 MM HG: CPT | Mod: CPTII,S$GLB,, | Performed by: INTERNAL MEDICINE

## 2023-03-23 PROCEDURE — 1160F PR REVIEW ALL MEDS BY PRESCRIBER/CLIN PHARMACIST DOCUMENTED: ICD-10-PCS | Mod: CPTII,S$GLB,, | Performed by: INTERNAL MEDICINE

## 2023-03-23 PROCEDURE — 84443 ASSAY THYROID STIM HORMONE: CPT | Performed by: INTERNAL MEDICINE

## 2023-03-23 PROCEDURE — 99395 PREV VISIT EST AGE 18-39: CPT | Mod: S$GLB,,, | Performed by: INTERNAL MEDICINE

## 2023-03-23 PROCEDURE — 99999 PR PBB SHADOW E&M-EST. PATIENT-LVL III: ICD-10-PCS | Mod: PBBFAC,,, | Performed by: INTERNAL MEDICINE

## 2023-03-23 NOTE — PROGRESS NOTES
"Subjective:       Patient ID: Henry Pham is a 22 y.o. male.    Chief Complaint: No chief complaint on file.    HPI  22 y.o. Male here for annual exam.      Vaccines: Influenza (2020); Tetanus (2009)  Eye exam: 2018     Exercise: not currently  Diet: regular     Past Medical History:  No date: Allergy      Comment:  seasonal  No date: Hypothyroidism   No date: Asthma, not well controlled  2004: Basal cell carcinoma      Comment:  right shoulder  No date: Constipation - functional  No date: Cough  No date: Obesity (BMI 30-39.9)  No date: Wheezing  Past Surgical History:  No date: TONSILLECTOMY  Social History    Socioeconomic History      Marital status: Single      Spouse name: Not on file      Number of children: Not on file      Years of education: Not on file      Highest education level: Not on file    Social Needs      Financial resource strain: Not on file      Food insecurity - worry: Not on file      Food insecurity - inability: Not on file      Transportation needs - medical: Not on file      Transportation needs - non-medical: Not on file    Occupational History      Not on file    Tobacco Use      Smoking status: Never Smoker      Smokeless tobacco: Never Used    Substance and Sexual Activity      Alcohol use: No      Drug use: No      Sexual activity: Not Currently        Partners: Female    Other Topics      Concerns:        Not on file    Social History Narrative      Preferred name "Marin"      Lives with mom (Dora) and step dad.  Student at [employer]        Review of patient's allergies indicates:  No Known Allergies  Review of Systems   Constitutional:  Negative for activity change, appetite change, chills, diaphoresis, fatigue, fever and unexpected weight change.   HENT:  Negative for nasal congestion, mouth sores, postnasal drip, rhinorrhea, sinus pressure/congestion, sneezing, sore throat, trouble swallowing and voice change.    Eyes:  Negative for discharge, itching and visual disturbance. "   Respiratory:  Negative for cough, chest tightness, shortness of breath and wheezing.    Cardiovascular:  Negative for chest pain, palpitations and leg swelling.   Gastrointestinal:  Negative for abdominal pain, blood in stool, constipation, diarrhea, nausea and vomiting.   Endocrine: Negative for cold intolerance and heat intolerance.   Genitourinary:  Negative for difficulty urinating, dysuria, flank pain, hematuria and urgency.   Musculoskeletal:  Negative for arthralgias, back pain, myalgias and neck pain.   Integumentary:  Negative for rash and wound.   Allergic/Immunologic: Negative for environmental allergies and food allergies.   Neurological:  Negative for dizziness, tremors, seizures, syncope, weakness and headaches.   Hematological:  Negative for adenopathy. Does not bruise/bleed easily.   Psychiatric/Behavioral:  Negative for confusion, sleep disturbance and suicidal ideas. The patient is not nervous/anxious.        Objective:      Physical Exam  Constitutional:       General: He is not in acute distress.     Appearance: Normal appearance. He is well-developed. He is not ill-appearing, toxic-appearing or diaphoretic.   HENT:      Head: Normocephalic and atraumatic.      Right Ear: External ear normal.      Left Ear: External ear normal.      Nose: Nose normal.      Mouth/Throat:      Pharynx: No oropharyngeal exudate.   Eyes:      General: No scleral icterus.        Right eye: No discharge.         Left eye: No discharge.      Extraocular Movements: Extraocular movements intact.      Conjunctiva/sclera: Conjunctivae normal.      Pupils: Pupils are equal, round, and reactive to light.   Neck:      Thyroid: No thyromegaly.      Vascular: No JVD.   Cardiovascular:      Rate and Rhythm: Normal rate and regular rhythm.      Pulses: Normal pulses.      Heart sounds: Normal heart sounds. No murmur heard.  Pulmonary:      Effort: Pulmonary effort is normal. No respiratory distress.      Breath sounds: Normal  breath sounds. No wheezing or rales.   Abdominal:      General: Bowel sounds are normal. There is no distension.      Palpations: Abdomen is soft.      Tenderness: There is no abdominal tenderness. There is no right CVA tenderness, left CVA tenderness, guarding or rebound.   Musculoskeletal:      Cervical back: Normal range of motion and neck supple. No rigidity.      Right lower leg: No edema.      Left lower leg: No edema.   Lymphadenopathy:      Cervical: No cervical adenopathy.   Skin:     General: Skin is warm and dry.      Capillary Refill: Capillary refill takes less than 2 seconds.      Coloration: Skin is not pale.      Findings: No rash.   Neurological:      General: No focal deficit present.      Mental Status: He is alert and oriented to person, place, and time. Mental status is at baseline.      Cranial Nerves: No cranial nerve deficit.      Sensory: No sensory deficit.      Motor: No weakness.      Coordination: Coordination normal.      Gait: Gait normal.      Deep Tendon Reflexes: Reflexes normal.   Psychiatric:         Mood and Affect: Mood normal.         Behavior: Behavior normal.         Thought Content: Thought content normal.         Judgment: Judgment normal.       Assessment:       Problem List Items Addressed This Visit          Endocrine    Hypothyroidism     Other Visit Diagnoses       Annual physical exam    -  Primary    Relevant Orders    CBC Auto Differential    Comprehensive Metabolic Panel    TSH    Lipid Panel    Urinalysis    T4, Free    Hemoglobin A1C            Plan:    Blood work ordered     Hypothyroidism- stable on Synthroid

## 2023-05-01 RX ORDER — LEVOTHYROXINE SODIUM 100 UG/1
100 TABLET ORAL
Qty: 90 TABLET | Refills: 3 | Status: SHIPPED | OUTPATIENT
Start: 2023-05-01 | End: 2023-11-06 | Stop reason: SDUPTHER

## 2023-05-01 NOTE — TELEPHONE ENCOUNTER
No care due was identified.  [facility] Embedded Care Due Messages. Reference number: [id number].   5/01/2023 11:41:13 AM CDT

## 2023-06-01 ENCOUNTER — OCCUPATIONAL HEALTH (OUTPATIENT)
Dept: URGENT CARE | Facility: CLINIC | Age: 23
End: 2023-06-01

## 2023-06-01 DIAGNOSIS — Z02.83 ENCOUNTER FOR DRUG SCREENING: Primary | ICD-10-CM

## 2023-06-01 LAB
CTP QC/QA: YES
POC 10 PANEL DRUG SCREEN: NEGATIVE

## 2023-06-01 PROCEDURE — 80305 DRUG TEST PRSMV DIR OPT OBS: CPT | Mod: S$GLB,,,

## 2023-06-01 PROCEDURE — 80305 POCT RAPID DRUG SCREEN 10 PANEL: ICD-10-PCS | Mod: S$GLB,,,

## 2023-11-06 RX ORDER — LEVOTHYROXINE SODIUM 100 UG/1
100 TABLET ORAL
Qty: 90 TABLET | Refills: 1 | Status: SHIPPED | OUTPATIENT
Start: 2023-11-06 | End: 2024-02-06 | Stop reason: DRUGHIGH

## 2023-11-06 NOTE — TELEPHONE ENCOUNTER
Refill Decision Note   Henry Pham  is requesting a refill authorization.  Brief Assessment and Rationale for Refill:  Approve     Medication Therapy Plan:         Comments:     Note composed:2:27 PM 11/06/2023

## 2023-11-06 NOTE — TELEPHONE ENCOUNTER
No care due was identified.  [facility] Embedded Care Due Messages. Reference number: [id number].   11/06/2023 10:09:30 AM CST

## 2024-02-05 ENCOUNTER — OFFICE VISIT (OUTPATIENT)
Dept: INTERNAL MEDICINE | Facility: CLINIC | Age: 24
End: 2024-02-05
Payer: COMMERCIAL

## 2024-02-05 ENCOUNTER — LAB VISIT (OUTPATIENT)
Dept: LAB | Facility: HOSPITAL | Age: 24
End: 2024-02-05
Payer: COMMERCIAL

## 2024-02-05 VITALS
TEMPERATURE: 98 F | BODY MASS INDEX: 35.37 KG/M2 | SYSTOLIC BLOOD PRESSURE: 114 MMHG | HEART RATE: 75 BPM | OXYGEN SATURATION: 98 % | WEIGHT: 252.63 LBS | HEIGHT: 71 IN | RESPIRATION RATE: 20 BRPM | DIASTOLIC BLOOD PRESSURE: 82 MMHG

## 2024-02-05 DIAGNOSIS — R53.83 FATIGUE, UNSPECIFIED TYPE: ICD-10-CM

## 2024-02-05 DIAGNOSIS — Z00.00 ANNUAL PHYSICAL EXAM: Primary | ICD-10-CM

## 2024-02-05 DIAGNOSIS — Z11.59 ENCOUNTER FOR HEPATITIS C SCREENING TEST FOR LOW RISK PATIENT: ICD-10-CM

## 2024-02-05 DIAGNOSIS — Z00.00 ANNUAL PHYSICAL EXAM: ICD-10-CM

## 2024-02-05 DIAGNOSIS — E03.9 HYPOTHYROIDISM, UNSPECIFIED TYPE: ICD-10-CM

## 2024-02-05 DIAGNOSIS — Z11.4 ENCOUNTER FOR SCREENING FOR HIV: ICD-10-CM

## 2024-02-05 DIAGNOSIS — E66.9 OBESITY (BMI 35.0-39.9 WITHOUT COMORBIDITY): ICD-10-CM

## 2024-02-05 LAB
25(OH)D3+25(OH)D2 SERPL-MCNC: 18 NG/ML (ref 30–96)
ALBUMIN SERPL BCP-MCNC: 4.2 G/DL (ref 3.5–5.2)
ALP SERPL-CCNC: 77 U/L (ref 55–135)
ALT SERPL W/O P-5'-P-CCNC: 20 U/L (ref 10–44)
ANION GAP SERPL CALC-SCNC: 12 MMOL/L (ref 8–16)
AST SERPL-CCNC: 18 U/L (ref 10–40)
BASOPHILS # BLD AUTO: 0.06 K/UL (ref 0–0.2)
BASOPHILS NFR BLD: 0.6 % (ref 0–1.9)
BILIRUB SERPL-MCNC: 0.5 MG/DL (ref 0.1–1)
BUN SERPL-MCNC: 14 MG/DL (ref 6–20)
CALCIUM SERPL-MCNC: 9.8 MG/DL (ref 8.7–10.5)
CHLORIDE SERPL-SCNC: 105 MMOL/L (ref 95–110)
CO2 SERPL-SCNC: 23 MMOL/L (ref 23–29)
CREAT SERPL-MCNC: 1.1 MG/DL (ref 0.5–1.4)
DIFFERENTIAL METHOD BLD: NORMAL
EOSINOPHIL # BLD AUTO: 0.1 K/UL (ref 0–0.5)
EOSINOPHIL NFR BLD: 1.3 % (ref 0–8)
ERYTHROCYTE [DISTWIDTH] IN BLOOD BY AUTOMATED COUNT: 13 % (ref 11.5–14.5)
EST. GFR  (NO RACE VARIABLE): >60 ML/MIN/1.73 M^2
ESTIMATED AVG GLUCOSE: 100 MG/DL (ref 68–131)
GLUCOSE SERPL-MCNC: 85 MG/DL (ref 70–110)
HBA1C MFR BLD: 5.1 % (ref 4–5.6)
HCT VFR BLD AUTO: 45.3 % (ref 40–54)
HCV AB SERPL QL IA: NORMAL
HGB BLD-MCNC: 15.1 G/DL (ref 14–18)
HIV 1+2 AB+HIV1 P24 AG SERPL QL IA: NORMAL
IMM GRANULOCYTES # BLD AUTO: 0.03 K/UL (ref 0–0.04)
IMM GRANULOCYTES NFR BLD AUTO: 0.3 % (ref 0–0.5)
LYMPHOCYTES # BLD AUTO: 3.4 K/UL (ref 1–4.8)
LYMPHOCYTES NFR BLD: 37.1 % (ref 18–48)
MCH RBC QN AUTO: 28.3 PG (ref 27–31)
MCHC RBC AUTO-ENTMCNC: 33.3 G/DL (ref 32–36)
MCV RBC AUTO: 85 FL (ref 82–98)
MONOCYTES # BLD AUTO: 1 K/UL (ref 0.3–1)
MONOCYTES NFR BLD: 10.5 % (ref 4–15)
NEUTROPHILS # BLD AUTO: 4.6 K/UL (ref 1.8–7.7)
NEUTROPHILS NFR BLD: 50.2 % (ref 38–73)
NRBC BLD-RTO: 0 /100 WBC
PLATELET # BLD AUTO: 247 K/UL (ref 150–450)
PMV BLD AUTO: 12.4 FL (ref 9.2–12.9)
POTASSIUM SERPL-SCNC: 4 MMOL/L (ref 3.5–5.1)
PROT SERPL-MCNC: 7 G/DL (ref 6–8.4)
RBC # BLD AUTO: 5.33 M/UL (ref 4.6–6.2)
SODIUM SERPL-SCNC: 140 MMOL/L (ref 136–145)
T4 FREE SERPL-MCNC: 1.1 NG/DL (ref 0.71–1.51)
TESTOST SERPL-MCNC: 348 NG/DL (ref 304–1227)
TSH SERPL DL<=0.005 MIU/L-ACNC: 5.26 UIU/ML (ref 0.4–4)
WBC # BLD AUTO: 9.25 K/UL (ref 3.9–12.7)

## 2024-02-05 PROCEDURE — 84443 ASSAY THYROID STIM HORMONE: CPT | Performed by: NURSE PRACTITIONER

## 2024-02-05 PROCEDURE — 3008F BODY MASS INDEX DOCD: CPT | Mod: CPTII,S$GLB,, | Performed by: NURSE PRACTITIONER

## 2024-02-05 PROCEDURE — 1160F RVW MEDS BY RX/DR IN RCRD: CPT | Mod: CPTII,S$GLB,, | Performed by: NURSE PRACTITIONER

## 2024-02-05 PROCEDURE — 3074F SYST BP LT 130 MM HG: CPT | Mod: CPTII,S$GLB,, | Performed by: NURSE PRACTITIONER

## 2024-02-05 PROCEDURE — 84439 ASSAY OF FREE THYROXINE: CPT | Performed by: NURSE PRACTITIONER

## 2024-02-05 PROCEDURE — 84403 ASSAY OF TOTAL TESTOSTERONE: CPT | Performed by: NURSE PRACTITIONER

## 2024-02-05 PROCEDURE — 36415 COLL VENOUS BLD VENIPUNCTURE: CPT | Mod: PO | Performed by: NURSE PRACTITIONER

## 2024-02-05 PROCEDURE — 99395 PREV VISIT EST AGE 18-39: CPT | Mod: S$GLB,,, | Performed by: NURSE PRACTITIONER

## 2024-02-05 PROCEDURE — 87389 HIV-1 AG W/HIV-1&-2 AB AG IA: CPT | Performed by: NURSE PRACTITIONER

## 2024-02-05 PROCEDURE — 80053 COMPREHEN METABOLIC PANEL: CPT | Performed by: NURSE PRACTITIONER

## 2024-02-05 PROCEDURE — 86803 HEPATITIS C AB TEST: CPT | Performed by: NURSE PRACTITIONER

## 2024-02-05 PROCEDURE — 1159F MED LIST DOCD IN RCRD: CPT | Mod: CPTII,S$GLB,, | Performed by: NURSE PRACTITIONER

## 2024-02-05 PROCEDURE — 82306 VITAMIN D 25 HYDROXY: CPT | Performed by: NURSE PRACTITIONER

## 2024-02-05 PROCEDURE — 3079F DIAST BP 80-89 MM HG: CPT | Mod: CPTII,S$GLB,, | Performed by: NURSE PRACTITIONER

## 2024-02-05 PROCEDURE — 85025 COMPLETE CBC W/AUTO DIFF WBC: CPT | Performed by: NURSE PRACTITIONER

## 2024-02-05 PROCEDURE — 83036 HEMOGLOBIN GLYCOSYLATED A1C: CPT | Performed by: NURSE PRACTITIONER

## 2024-02-05 PROCEDURE — 99999 PR PBB SHADOW E&M-EST. PATIENT-LVL III: CPT | Mod: PBBFAC,,, | Performed by: NURSE PRACTITIONER

## 2024-02-05 NOTE — ASSESSMENT & PLAN NOTE
Pt recommended to complete labs which include testosterone, CMP, CBC, TSH and vitamin D level.   If no clear etiology for his fatigue will consider sleep study.   Pt will be contacted via pt portal with results.

## 2024-02-05 NOTE — ASSESSMENT & PLAN NOTE
Repeat labs ordered today.   He will be contacted with results and updated care plan as warranted.

## 2024-02-05 NOTE — PROGRESS NOTES
Subjective:     Henry Pham is a 23 y.o. male who presents for an annual exam.    Hypothyroidism continues to be managed with levothyroxine. He is currently due for repeat labs.     Pt continues to complain of ongoing fatigue despite continued use of levothyroxine. He indicates that his busy work schedule was a likely contributing factor due to work load and increased traveling. He has been back home for the last 2 months and his fatigues is still  ongoing. He indicates he gets about 6-8 hours of sleep nightly but continues to wake up fatigues. He does not think he snores. He has also started going back to the gym without any improvement.   PCP: Tao Boyer DO    Medical History:   Past Medical History:   Diagnosis Date    Allergy     seasonal    Asthma, not well controlled     Basal cell carcinoma 2004    right shoulder    Constipation - functional     Cough     Hypothyroidism     Obesity (BMI 30-39.9)     Wheezing      Family History: family history includes Eczema in his cousin; Hearing loss in his paternal grandfather and paternal grandmother; Heart disease in an other family member; Melanoma in his cousin; No Known Problems in his brother, father, maternal grandfather, maternal uncle, paternal aunt, paternal uncle, and sister; Other in his mother; Thyroid disease in his maternal aunt, maternal grandmother, and mother.    Surgical History:   Past Surgical History:   Procedure Laterality Date    TONSILLECTOMY        Social History:  reports that he has never smoked. He has never used smokeless tobacco. He reports that he does not drink alcohol and does not use drugs.   Allergies: Review of patient's allergies indicates:  No Known Allergies  Medications:   Current Outpatient Medications:     levothyroxine (SYNTHROID) 100 MCG tablet, Take 1 tablet (100 mcg total) by mouth before breakfast., Disp: 90 tablet, Rfl: 1    Health Maintenance: The patient has no Health Maintenance topics of status  "Not Due  No results found for: "HEPCAB", "[id number]"  Eye Exam:   Dental Exam:  Colonoscopy:    FitKit:  Cologuard:  HIV screening:  Hepatitis C screening:    Vaccinations:   Immunization History   Administered Date(s) Administered    COVID-19, MRNA, LN-S, PF (Pfizer) (Purple Cap) 03/24/2021, 04/14/2021    HPV Quadrivalent 06/24/2014, 05/29/2015    Influenza - Quadrivalent - PF *Preferred* (6 months and older) 01/08/2017    Meningococcal Conjugate (MCV4P) 06/24/2014     Influenza:   Tetanus:   Shingrix:   Pneumovax:   Prevnar-13:  Covid vaccine:       Body mass index is 35.24 kg/m².  Wt Readings from Last 3 Encounters:   02/05/24 114.6 kg (252 lb 10.4 oz)   03/23/23 109.8 kg (241 lb 15.3 oz)   04/22/22 94.8 kg (209 lb)       Review of Systems   Constitutional:  Positive for fatigue. Negative for activity change and unexpected weight change.   HENT:  Negative for hearing loss, rhinorrhea and trouble swallowing.    Eyes:  Negative for discharge and visual disturbance.   Respiratory:  Negative for chest tightness and wheezing.    Cardiovascular:  Negative for chest pain and palpitations.   Gastrointestinal:  Negative for blood in stool, constipation, diarrhea and vomiting.   Endocrine: Negative for polydipsia and polyuria.   Genitourinary:  Negative for difficulty urinating, hematuria and urgency.   Musculoskeletal:  Negative for arthralgias, joint swelling and neck pain.   Neurological:  Negative for weakness and headaches.   Psychiatric/Behavioral:  Negative for confusion and dysphoric mood.    All other systems reviewed and are negative.         Objective:     Physical Exam  Vitals reviewed.   Constitutional:       Appearance: Normal appearance. He is obese.   HENT:      Head: Normocephalic and atraumatic.      Nose: Nose normal.      Mouth/Throat:      Mouth: Mucous membranes are moist.   Eyes:      Pupils: Pupils are equal, round, and reactive to light.   Cardiovascular:      Rate and Rhythm: Normal rate and " regular rhythm.      Heart sounds: Normal heart sounds.   Pulmonary:      Effort: Pulmonary effort is normal.      Breath sounds: Normal breath sounds.   Abdominal:      General: Bowel sounds are normal.      Palpations: Abdomen is soft.   Musculoskeletal:         General: Normal range of motion.      Cervical back: Normal range of motion.   Skin:     General: Skin is warm and dry.   Neurological:      General: No focal deficit present.      Mental Status: He is alert and oriented to person, place, and time.   Psychiatric:         Mood and Affect: Mood normal.         Behavior: Behavior normal.            Assessment:        1. Annual physical exam    2. Hypothyroidism, unspecified type    3. Fatigue, unspecified type    4. Obesity (BMI 35.0-39.9 without comorbidity)    5. Encounter for screening for HIV    6. Encounter for hepatitis C screening test for low risk patient           Plan:     1. Annual physical exam  -patient encouraged to complete labs today.    He will be contacted via patient portal with results  - TSH; Future  - Comprehensive Metabolic Panel; Future  - T4, FREE; Future    2. Hypothyroidism, unspecified type  -Repeat labs ordered today.   He will be contacted with results and updated care plan as warranted  - TSH; Future  - Comprehensive Metabolic Panel; Future  - T4, FREE; Future    3. Fatigue, unspecified type  -Pt recommended to complete labs which include testosterone, CMP, CBC, TSH and vitamin D level.   If no clear etiology for his fatigue will consider sleep study.   Pt will be contacted via pt portal with results.   - Vitamin D; Future  - CBC W/ AUTO DIFFERENTIAL; Future  - TESTOSTERONE; Future    4. Obesity (BMI 35.0-39.9 without comorbidity)  -Pt encouraged to continue going to the gym 3-4 x weekly.    - HEMOGLOBIN A1C; Future    5. Encounter for screening for HIV  - HIV 1/2 Ag/Ab (4th Gen); Future    6. Encounter for hepatitis C screening test for low risk patient  - Hepatitis C Antibody;  Future    RTC in 12 months or sooner if needed    __________________________

## 2024-02-06 ENCOUNTER — TELEPHONE (OUTPATIENT)
Dept: INTERNAL MEDICINE | Facility: CLINIC | Age: 24
End: 2024-02-06
Payer: COMMERCIAL

## 2024-02-06 DIAGNOSIS — E03.9 HYPOTHYROIDISM, UNSPECIFIED TYPE: ICD-10-CM

## 2024-02-06 DIAGNOSIS — E55.9 VITAMIN D DEFICIENCY: Primary | ICD-10-CM

## 2024-02-06 RX ORDER — CHOLECALCIFEROL (VITAMIN D3) 1250 MCG
1250 TABLET ORAL
Qty: 8 TABLET | Refills: 0 | Status: SHIPPED | OUTPATIENT
Start: 2024-02-06 | End: 2024-02-08 | Stop reason: SDUPTHER

## 2024-02-06 RX ORDER — LEVOTHYROXINE SODIUM 112 UG/1
112 TABLET ORAL
Qty: 30 TABLET | Refills: 11 | Status: SHIPPED | OUTPATIENT
Start: 2024-02-06 | End: 2024-02-08 | Stop reason: SDUPTHER

## 2024-02-08 DIAGNOSIS — E03.9 HYPOTHYROIDISM, UNSPECIFIED TYPE: ICD-10-CM

## 2024-02-08 DIAGNOSIS — E55.9 VITAMIN D DEFICIENCY: ICD-10-CM

## 2024-02-08 NOTE — TELEPHONE ENCOUNTER
No care due was identified.  [facility] Embedded Care Due Messages. Reference number: [id number].   2/08/2024 1:57:21 PM CST

## 2024-02-08 NOTE — TELEPHONE ENCOUNTER
----- Message from Ary Coyne sent at 2/8/2024 11:43 AM CST -----  Contact: [phone]  [email or web address]     Patient is calling for Medical Advice regarding:prescriptions that need to be sent somewhere else     How long has patient had these symptoms:    Pharmacy name and phone#:    [facility] - [facility] [id number] [facility] [address]  Phone: [phone] Fax: [phone]       Would like response via giddyt:  no     Comments:  Pt states he was seen on Monday and he states the new prescriptions that were sent need to go here to the listed pharmacy above

## 2024-02-09 RX ORDER — LEVOTHYROXINE SODIUM 112 UG/1
112 TABLET ORAL
Qty: 30 TABLET | Refills: 11 | Status: SHIPPED | OUTPATIENT
Start: 2024-02-09 | End: 2024-05-07 | Stop reason: SDUPTHER

## 2024-02-09 RX ORDER — CHOLECALCIFEROL (VITAMIN D3) 1250 MCG
1250 TABLET ORAL
Qty: 8 TABLET | Refills: 0 | Status: SHIPPED | OUTPATIENT
Start: 2024-02-09

## 2024-02-12 ENCOUNTER — HOSPITAL ENCOUNTER (EMERGENCY)
Facility: HOSPITAL | Age: 24
Discharge: HOME | End: 2024-02-12
Attending: STUDENT IN AN ORGANIZED HEALTH CARE EDUCATION/TRAINING PROGRAM
Payer: COMMERCIAL

## 2024-02-12 VITALS
OXYGEN SATURATION: 98 % | BODY MASS INDEX: 34.72 KG/M2 | SYSTOLIC BLOOD PRESSURE: 137 MMHG | TEMPERATURE: 99 F | HEIGHT: 71 IN | WEIGHT: 248 LBS | RESPIRATION RATE: 16 BRPM | HEART RATE: 77 BPM | DIASTOLIC BLOOD PRESSURE: 91 MMHG

## 2024-02-12 DIAGNOSIS — M79.643 TENDERNESS OF ANATOMICAL SNUFFBOX: Primary | ICD-10-CM
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
## 2024-02-12 PROCEDURE — 29125 APPL SHORT ARM SPLINT STATIC: CPT | Mod: LT

## 2024-02-12 PROCEDURE — 29105 APPLICATION LONG ARM SPLINT: CPT | Mod: LT

## 2024-02-12 PROCEDURE — 99283 EMERGENCY DEPT VISIT LOW MDM: CPT | Mod: 25

## 2024-02-12 NOTE — ED NOTES
Patient identifiers verified and correct for Mr Pham  C/C: Pain to left hand SEE NN  APPEARANCE: awake and alert in NAD. PAIN  7/10 With mvmt  SKIN: warm, dry and intact. No breakdown or bruising.  MUSCULOSKELETAL: Patient moving all extremities spontaneously, no obvious swelling or deformities noted. Ambulates independently.  RESPIRATORY: Denies shortness of breath.Respirations unlabored.   CARDIAC: Denies CP, 2+ distal pulses; no peripheral edema  ABDOMEN: S/ND/NT, Denies nausea  : voids spontaneously, denies difficulty  Neurologic: AAO x 4; follows commands equal strength in all extremities; denies numbness/tingling. Denies dizziness Denies new weakness

## 2024-02-12 NOTE — ED PROVIDER NOTES
Chief Complaint   Hand Injury (Left hand injury last night/fell- left thumb tender)      History Of Present Illness   Henry Pham is a 23 y.o. male with no pertinent PMHx presenting with hand pain.  Patient states that he slipped on some water drop beads and caught his fall with his left hand yesterday.  States that he has had swelling and pain to the area around his left thumb since then.  Has an upcoming appointment with the orthopedic clinic on Wednesday he was advised to come to the ED for an x-ray and likely splint.  States he is able to move his thumb but with pain.  He reports no numbness, or tingling.  He reports no other pain or injuries.  Isn't on any blood thinners.  No reported wounds, abrasions, or lacerations.    Independent Historian: Yes  Other Historian or Collateral: Chart review and mother at bedside  Interpretor: No      Review of patient's allergies indicates:  No Known Allergies    No current facility-administered medications on file prior to encounter.     Current Outpatient Medications on File Prior to Encounter   Medication Sig Dispense Refill    levothyroxine (SYNTHROID) 112 MCG tablet Take 1 tablet (112 mcg total) by mouth before breakfast. 30 tablet 11    cholecalciferol, vitamin D3, 1,250 mcg (50,000 unit) Tab Take 1,250 mcg by mouth every 7 days. 8 tablet 0       Past History   As per HPI and below:  Past Medical History:   Diagnosis Date    Allergy     seasonal    Asthma, not well controlled     Basal cell carcinoma 2004    right shoulder    Constipation - functional     Cough     Hypothyroidism     Obesity (BMI 30-39.9)     Wheezing      Past Surgical History:   Procedure Laterality Date    TONSILLECTOMY         Social History     Socioeconomic History    Marital status: Single   Tobacco Use    Smoking status: Never    Smokeless tobacco: Never   Substance and Sexual Activity    Alcohol use: No    Drug use: No    Sexual activity: Not Currently     Partners: Female   Social  "History Narrative    Preferred name "Marin"    Lives with mom (Dora) and step dad.  Student at [employer]                             Social Determinants of Health     Financial Resource Strain: Low Risk  (2/5/2024)    Overall Financial Resource Strain (CARDIA)     Difficulty of Paying Living Expenses: Not very hard   Food Insecurity: No Food Insecurity (2/5/2024)    Hunger Vital Sign     Worried About Running Out of Food in the Last Year: Never true     Ran Out of Food in the Last Year: Never true   Transportation Needs: No Transportation Needs (2/5/2024)    PRAPARE - Transportation     Lack of Transportation (Medical): No     Lack of Transportation (Non-Medical): No   Physical Activity: Sufficiently Active (2/5/2024)    Exercise Vital Sign     Days of Exercise per Week: 5 days     Minutes of Exercise per Session: 60 min   Stress: No Stress Concern Present (2/5/2024)    [employer] - Occupational Stress Questionnaire     Feeling of Stress : Not at all   Social Connections: Unknown (2/5/2024)    Social Connection and Isolation Panel [NHANES]     Frequency of Communication with Friends and Family: More than three times a week     Frequency of Social Gatherings with Friends and Family: Three times a week     Active Member of Clubs or Organizations: No     Attends Club or Organization Meetings: Never     Marital Status: Never    Housing Stability: Low Risk  (2/5/2024)    Housing Stability Vital Sign     Unable to Pay for Housing in the Last Year: No     Number of Places Lived in the Last Year: 2     Unstable Housing in the Last Year: No       Family History   Problem Relation Age of Onset    Other Mother         mitral valve prolapse    Thyroid disease Mother     Melanoma Cousin     Eczema Cousin     No Known Problems Father     No Known Problems Sister     No Known Problems Brother     Hearing loss Paternal Grandmother     Hearing loss Paternal Grandfather     Heart disease Other      " "   cardiomyopathy    Thyroid disease Maternal Aunt     Thyroid disease Maternal Grandmother     No Known Problems Maternal Uncle     No Known Problems Paternal Aunt     No Known Problems Paternal Uncle     No Known Problems Maternal Grandfather     Amblyopia Neg Hx     Blindness Neg Hx     Cataracts Neg Hx     Diabetes Neg Hx     Glaucoma Neg Hx     Retinal detachment Neg Hx     Strabismus Neg Hx     Psoriasis Neg Hx     Lupus Neg Hx     Acne Neg Hx     Cancer Neg Hx     Hypertension Neg Hx     Macular degeneration Neg Hx     Stroke Neg Hx        Physical Exam     Vitals:    02/12/24 1626 02/12/24 1857   BP: (!) 141/94 (!) 137/91   BP Location: Right arm    Patient Position: Sitting    Pulse: 79 77   Resp: 16 16   Temp: 98.7 °F (37.1 °C) 98.9 °F (37.2 °C)   TempSrc: Oral Oral   SpO2: 98% 98%   Weight: 112.5 kg (248 lb)    Height: 5' 11" (1.803 m)        Physical Exam  Constitutional:       General: He is not in acute distress.     Appearance: He is well-developed. He is not toxic-appearing or diaphoretic.   HENT:      Head: Normocephalic and atraumatic.      Nose: Nose normal.      Mouth/Throat:      Mouth: Mucous membranes are moist.   Eyes:      General: No scleral icterus.     Extraocular Movements: Extraocular movements intact.   Cardiovascular:      Rate and Rhythm: Normal rate and regular rhythm.   Pulmonary:      Effort: No respiratory distress.      Breath sounds: No stridor.   Abdominal:      General: There is no distension.   Musculoskeletal:        Arms:       Cervical back: No rigidity.      Comments: Pain and swelling to the palm around the base of the left thumb, particularly on the palmar aspect.  Positive snuffbox tenderness.  No obvious deformity.    Skin:     General: Skin is warm and dry.      Capillary Refill: Capillary refill takes less than 2 seconds.   Neurological:      General: No focal deficit present.      Mental Status: He is alert and oriented to person, place, and time.      Comments: " Intact sensation to light touch on both dorsal and palmar aspects of the thumb.  To flex and extend left thumb mild difficulty secondary to pain.             Results   Labs Reviewed - No data to display    Imaging Results              X-Ray Hand 3 view Left (Final result)  Result time 02/12/24 18:17:28      Final result by Tao Almonte MD (02/12/24 18:17:28)                   Impression:      No acute displaced fracture-dislocation identified.      Electronically signed by: Tao Almonte MD  Date:    02/12/2024  Time:    18:17               Narrative:    EXAMINATION:  XR HAND COMPLETE 3 VIEW LEFT    CLINICAL HISTORY:  fall, snuff box tenderness;.    TECHNIQUE:  PA, lateral, and oblique views of the left hand were performed.    COMPARISON:  None    FINDINGS:  Bones are well mineralized.  Slight ulnar minus variance.  No displaced fracture, dislocation or destructive osseous process.  Joint spaces appear relatively maintained.  No subcutaneous emphysema or radiodense retained foreign body.                                            Initial [facility]   Medical Decision Making  Patient is a 23 year old male presenting with hand pain after fall.  Most concerning for fracture versus contusion.  X-ray shows no evidence of acute fracture but given the his snuffbox tenderness, we will apply thumb spica splint.  Patient has scheduled follow up appointment on Wednesday with Orthopedic surgery.  Given return precautions.  Advised on symptomatic management.  DC to home.    Amount and/or Complexity of Data Reviewed  Radiology: ordered.               Medications Given / Interventions   Medications - No data to display    Procedures     ED POCUS Performed: No    Reassessment and ED Course               Final diagnoses:  [M79.643] Tenderness of anatomical snuffbox (Primary)  [W19.XXXA] Fall, initial encounter           Dispo      ED Disposition Condition    Discharge Stable            ED Prescriptions    None       Follow-up  Information       Follow up With Specialties Details Why Contact Info    Tao Boyer, DO Internal Medicine Schedule an appointment as soon as possible for a visit in 1 week  [address] [facility] [address]  [phone]                          Kaur Laird MD  02/12/24 3345

## 2024-02-13 NOTE — DISCHARGE INSTRUCTIONS
You were seen in the emergency department today for pain to your thumb area after recent fall.  Your x-ray did not show any evidence of broken bones but these sometimes do not show up initially on x-ray.  You were given his splint.  Wrist.  Please follow up at your scheduled appointment with orthopedic surgery this Wednesday.  You may take Tylenol, and Motrin as needed for pain.    Please return to the emergency department if you experience any new or concerning symptoms including worsening pain, swelling, numbness, tingling, lightheadedness, dizziness, loss of consciousness.

## 2024-02-14 ENCOUNTER — OFFICE VISIT (OUTPATIENT)
Dept: ORTHOPEDICS | Facility: CLINIC | Age: 24
End: 2024-02-14
Payer: COMMERCIAL

## 2024-02-14 VITALS — WEIGHT: 248 LBS | HEIGHT: 71 IN | BODY MASS INDEX: 34.72 KG/M2

## 2024-02-14 DIAGNOSIS — S63.642A SPRAIN OF METACARPOPHALANGEAL (MCP) JOINT OF LEFT THUMB, INITIAL ENCOUNTER: Primary | ICD-10-CM

## 2024-02-14 PROCEDURE — 99204 OFFICE O/P NEW MOD 45 MIN: CPT | Mod: S$GLB,,, | Performed by: SPECIALIST/TECHNOLOGIST

## 2024-02-14 PROCEDURE — 3008F BODY MASS INDEX DOCD: CPT | Mod: CPTII,S$GLB,, | Performed by: SPECIALIST/TECHNOLOGIST

## 2024-02-14 PROCEDURE — 99999 PR PBB SHADOW E&M-EST. PATIENT-LVL III: CPT | Mod: PBBFAC,,, | Performed by: SPECIALIST/TECHNOLOGIST

## 2024-02-14 PROCEDURE — 3044F HG A1C LEVEL LT 7.0%: CPT | Mod: CPTII,S$GLB,, | Performed by: SPECIALIST/TECHNOLOGIST

## 2024-02-14 PROCEDURE — 1159F MED LIST DOCD IN RCRD: CPT | Mod: CPTII,S$GLB,, | Performed by: SPECIALIST/TECHNOLOGIST

## 2024-02-14 NOTE — PROGRESS NOTES
Subjective:       Patient ID: Henry Pham is a 23 y.o. male.    Chief Complaint: No chief complaint on file.      HPI  02/14/2024   Patient reports to clinic today after sustaining a fall on a right hand outstretched after slipping on beads at the [employer].  He reported most his pain to be in the anatomical snuffbox.  He went to the emergency room the following day where x-rays revealed no fracture.  He was placed in a thumb spica brace with concerns of a scaphoid fracture and referred to our clinic for further evaluation.    On evaluation today patient presents with a prefabricated thumb spica brace.  He states he is continued pain but is localized over the MCP joint of the thumb.  He denies any numbness or tingling.  He denies any previous surgeries or trauma to the wrist or hand.  States he is able to flex at the IP joint but notes stiffness.    Past Medical History:   Diagnosis Date    Allergy     seasonal    Asthma, not well controlled     Basal cell carcinoma 2004    right shoulder    Constipation - functional     Cough     Hypothyroidism     Obesity (BMI 30-39.9)     Wheezing      Past Surgical History:   Procedure Laterality Date    TONSILLECTOMY       Family History   Problem Relation Age of Onset    Other Mother         mitral valve prolapse    Thyroid disease Mother     Melanoma Cousin     Eczema Cousin     No Known Problems Father     No Known Problems Sister     No Known Problems Brother     Hearing loss Paternal Grandmother     Hearing loss Paternal Grandfather     Heart disease Other         cardiomyopathy    Thyroid disease Maternal Aunt     Thyroid disease Maternal Grandmother     No Known Problems Maternal Uncle     No Known Problems Paternal Aunt     No Known Problems Paternal Uncle     No Known Problems Maternal Grandfather     Amblyopia Neg Hx     Blindness Neg Hx     Cataracts Neg Hx     Diabetes Neg Hx     Glaucoma Neg Hx     Retinal detachment Neg Hx     Strabismus Neg Hx      "Psoriasis Neg Hx     Lupus Neg Hx     Acne Neg Hx     Cancer Neg Hx     Hypertension Neg Hx     Macular degeneration Neg Hx     Stroke Neg Hx      Social History     Socioeconomic History    Marital status: Single   Tobacco Use    Smoking status: Never    Smokeless tobacco: Never   Substance and Sexual Activity    Alcohol use: No    Drug use: No    Sexual activity: Not Currently     Partners: Female   Social History Narrative    Preferred name "Marin"    Lives with mom (Dora) and step dad.  Student at [employer]                             Social Determinants of Health     Financial Resource Strain: Low Risk  (2/5/2024)    Overall Financial Resource Strain (CARDIA)     Difficulty of Paying Living Expenses: Not very hard   Food Insecurity: No Food Insecurity (2/5/2024)    Hunger Vital Sign     Worried About Running Out of Food in the Last Year: Never true     Ran Out of Food in the Last Year: Never true   Transportation Needs: No Transportation Needs (2/5/2024)    PRAPARE - Transportation     Lack of Transportation (Medical): No     Lack of Transportation (Non-Medical): No   Physical Activity: Sufficiently Active (2/5/2024)    Exercise Vital Sign     Days of Exercise per Week: 5 days     Minutes of Exercise per Session: 60 min   Stress: No Stress Concern Present (2/5/2024)    [place] [employer] - Occupational Stress Questionnaire     Feeling of Stress : Not at all   Social Connections: Unknown (2/5/2024)    Social Connection and Isolation Panel [NHANES]     Frequency of Communication with Friends and Family: More than three times a week     Frequency of Social Gatherings with Friends and Family: Three times a week     Active Member of Clubs or Organizations: No     Attends Club or Organization Meetings: Never     Marital Status: Never    Housing Stability: Low Risk  (2/5/2024)    Housing Stability Vital Sign     Unable to Pay for Housing in the Last Year: No     Number of Places Lived in " the Last Year: 2     Unstable Housing in the Last Year: No       Current Outpatient Medications   Medication Sig Dispense Refill    cholecalciferol, vitamin D3, 1,250 mcg (50,000 unit) Tab Take 1,250 mcg by mouth every 7 days. 8 tablet 0    levothyroxine (SYNTHROID) 112 MCG tablet Take 1 tablet (112 mcg total) by mouth before breakfast. 30 tablet 11     No current facility-administered medications for this visit.     Review of patient's allergies indicates:  No Known Allergies    Review of Systems        Objective:      There were no vitals filed for this visit.  Physical Exam  Cardiovascular:      Pulses:           Radial pulses are Normal on the right side and Normal on the left side.   Skin:     General: Skin is intact.   Psychiatric:         Mood and Affect: Mood and affect normal.       Hand/Wrist Musculoskeletal Exam    Inspection    Right      Erythema: none      Ecchymosis: none      Edema: none      Deformity: none      Wrist - prior incision: none    Left      Erythema: none      Ecchymosis: none      Edema: none      Deformity: none      Wrist - prior incision: none    Palpation    Left      Thumb tenderness to palpation: metacarpophalangeal joint      Thumb tenderness to palpation comment: Tenderness over the ulnar collateral and radial collateral ligaments.    Range of Motion     Left Hand      Thumb interphalangeal: limited        Thumb metacarpal phalangeal joint: limited       Thumb carpometacarpal joint: limited      Right Wrist      Right wrist range of motion is normal.      Left Wrist      Left wrist range of motion is normal.      Range of motion additional comments: Able to make a composite fist.  Left thumb movement is limited secondary to pain    Neurovascular    Right       Radial pulse: normal      Capillary refill: brisk and <3 sec      Ulnar nerve sensory distribution: normal      Median nerve sensory distribution: normal      Superficial radial nerve sensory distribution: normal    Left        Radial pulse: normal      Capillary refill: brisk and <3 sec      Ulnar nerve sensory distribution: normal      Median nerve sensory distribution: normal      Superficial radial nerve sensory distribution: normal    Special Tests    Special tests additional comments: Laxity present at the RCL of the left thumb.  Stable endpoint felt on the UCL of the left thumb    General    Labored breathing: no    Psychiatric: normal mood and affect    Neurological: oriented x3    Skin: intact    Diagnostics Review: X-Ray: Reviewed  Left hand x-rays   02/14/2024  FINDINGS:  Bones are well mineralized.  Slight ulnar minus variance.  No displaced fracture, dislocation or destructive osseous process.  Joint spaces appear relatively maintained.  No subcutaneous emphysema or radiodense retained foreign body.     Impression:     No acute displaced fracture-dislocation identified.       Assessment:       No diagnosis found.    Plan:       Treatment options discussed with the patient and likely injury is to the RCL of the left thumb.  We will place the patient in a thumb spica prefabricated brace and refer to further imaging to evaluate the L thumb.  Patient we will follow up with Dr. Toscano after imaging is complete.

## 2024-02-24 ENCOUNTER — CLINICAL SUPPORT (OUTPATIENT)
Dept: OTHER | Facility: CLINIC | Age: 24
End: 2024-02-24
Payer: COMMERCIAL

## 2024-02-24 DIAGNOSIS — Z00.8 ENCOUNTER FOR OTHER GENERAL EXAMINATION: ICD-10-CM

## 2024-02-27 ENCOUNTER — OFFICE VISIT (OUTPATIENT)
Dept: ORTHOPEDICS | Facility: CLINIC | Age: 24
End: 2024-02-27
Payer: COMMERCIAL

## 2024-02-27 VITALS — HEIGHT: 71 IN | WEIGHT: 248 LBS | BODY MASS INDEX: 34.72 KG/M2

## 2024-02-27 DIAGNOSIS — S63.629A COMPLETE TEAR OF RADIAL COLLATERAL LIGAMENT OF INTERPHALANGEAL JOINT OF THUMB: Primary | ICD-10-CM

## 2024-02-27 DIAGNOSIS — S63.642A SPRAIN OF METACARPOPHALANGEAL (MCP) JOINT OF LEFT THUMB, INITIAL ENCOUNTER: Primary | ICD-10-CM

## 2024-02-27 DIAGNOSIS — S63.642A SPRAIN OF METACARPOPHALANGEAL (MCP) JOINT OF LEFT THUMB, INITIAL ENCOUNTER: ICD-10-CM

## 2024-02-27 PROCEDURE — 99999 PR PBB SHADOW E&M-EST. PATIENT-LVL III: CPT | Mod: PBBFAC,,, | Performed by: ORTHOPAEDIC SURGERY

## 2024-02-27 PROCEDURE — 99214 OFFICE O/P EST MOD 30 MIN: CPT | Mod: S$GLB,,, | Performed by: ORTHOPAEDIC SURGERY

## 2024-02-27 PROCEDURE — 3044F HG A1C LEVEL LT 7.0%: CPT | Mod: CPTII,S$GLB,, | Performed by: ORTHOPAEDIC SURGERY

## 2024-02-27 PROCEDURE — 1159F MED LIST DOCD IN RCRD: CPT | Mod: CPTII,S$GLB,, | Performed by: ORTHOPAEDIC SURGERY

## 2024-02-27 PROCEDURE — 3008F BODY MASS INDEX DOCD: CPT | Mod: CPTII,S$GLB,, | Performed by: ORTHOPAEDIC SURGERY

## 2024-02-27 NOTE — H&P (VIEW-ONLY)
Subjective:       Patient ID: Henry Pham is a 23 y.o. male.    Chief Complaint: Pain of the Left Hand      HPI 02/27/2024  Mr. Pham is here for a FU for left thumb/hand after sustaining a FOOSH on 2/11/2024. He reports he continue to have pain in left MCP radial and ulnar collateral aspect of thumb. He denies any numbness or tingling. Admits to wearing his thumb spica brace full time.         HPI  02/14/2024   Patient reports to clinic today after sustaining a fall on a right hand outstretched after slipping on beads at the [employer].  He reported most his pain to be in the anatomical snuffbox.  He went to the emergency room the following day where x-rays revealed no fracture.  He was placed in a thumb spica brace with concerns of a scaphoid fracture and referred to our clinic for further evaluation.    On evaluation today patient presents with a prefabricated thumb spica brace.  He states he is continued pain but is localized over the MCP joint of the thumb.  He denies any numbness or tingling.  He denies any previous surgeries or trauma to the wrist or hand.  States he is able to flex at the IP joint but notes stiffness.    Past Medical History:   Diagnosis Date    Allergy     seasonal    Asthma, not well controlled     Basal cell carcinoma 2004    right shoulder    Constipation - functional     Cough     Hypothyroidism     Obesity (BMI 30-39.9)     Wheezing      Past Surgical History:   Procedure Laterality Date    TONSILLECTOMY       Family History   Problem Relation Age of Onset    Other Mother         mitral valve prolapse    Thyroid disease Mother     Melanoma Cousin     Eczema Cousin     No Known Problems Father     No Known Problems Sister     No Known Problems Brother     Hearing loss Paternal Grandmother     Hearing loss Paternal Grandfather     Heart disease Other         cardiomyopathy    Thyroid disease Maternal Aunt     Thyroid disease Maternal Grandmother     No Known Problems  "Maternal Uncle     No Known Problems Paternal Aunt     No Known Problems Paternal Uncle     No Known Problems Maternal Grandfather     Amblyopia Neg Hx     Blindness Neg Hx     Cataracts Neg Hx     Diabetes Neg Hx     Glaucoma Neg Hx     Retinal detachment Neg Hx     Strabismus Neg Hx     Psoriasis Neg Hx     Lupus Neg Hx     Acne Neg Hx     Cancer Neg Hx     Hypertension Neg Hx     Macular degeneration Neg Hx     Stroke Neg Hx      Social History     Socioeconomic History    Marital status: Single   Tobacco Use    Smoking status: Never    Smokeless tobacco: Never   Substance and Sexual Activity    Alcohol use: No    Drug use: No    Sexual activity: Not Currently     Partners: Female   Social History Narrative    Preferred name "Marin"    Lives with mom (Dora) and step dad.  Student at [employer]                             Social Determinants of Health     Financial Resource Strain: Low Risk  (2/5/2024)    Overall Financial Resource Strain (CARDIA)     Difficulty of Paying Living Expenses: Not very hard   Food Insecurity: No Food Insecurity (2/5/2024)    Hunger Vital Sign     Worried About Running Out of Food in the Last Year: Never true     Ran Out of Food in the Last Year: Never true   Transportation Needs: No Transportation Needs (2/5/2024)    PRAPARE - Transportation     Lack of Transportation (Medical): No     Lack of Transportation (Non-Medical): No   Physical Activity: Sufficiently Active (2/5/2024)    Exercise Vital Sign     Days of Exercise per Week: 5 days     Minutes of Exercise per Session: 60 min   Stress: No Stress Concern Present (2/5/2024)    [place] of Occupational Health - Occupational Stress Questionnaire     Feeling of Stress : Not at all   Social Connections: Unknown (2/5/2024)    Social Connection and Isolation Panel [NHANES]     Frequency of Communication with Friends and Family: More than three times a week     Frequency of Social Gatherings with Friends and Family: Three times a " "week     Active Member of Clubs or Organizations: No     Attends Club or Organization Meetings: Never     Marital Status: Never    Housing Stability: Low Risk  (2/5/2024)    Housing Stability Vital Sign     Unable to Pay for Housing in the Last Year: No     Number of Places Lived in the Last Year: 2     Unstable Housing in the Last Year: No       Current Outpatient Medications   Medication Sig Dispense Refill    cholecalciferol, vitamin D3, 1,250 mcg (50,000 unit) Tab Take 1,250 mcg by mouth every 7 days. 8 tablet 0    levothyroxine (SYNTHROID) 112 MCG tablet Take 1 tablet (112 mcg total) by mouth before breakfast. 30 tablet 11     No current facility-administered medications for this visit.     Review of patient's allergies indicates:  No Known Allergies    Review of Systems        Objective:      Vitals:    02/27/24 0926   Weight: 112.5 kg (248 lb 0.3 oz)   Height: 5' 11" (1.803 m)     Physical Exam  Cardiovascular:      Pulses:           Radial pulses are Normal on the right side and Normal on the left side.   Skin:     General: Skin is intact.   Psychiatric:         Mood and Affect: Mood and affect normal.       Hand/Wrist Musculoskeletal Exam    Inspection    Right      Erythema: none      Ecchymosis: none      Edema: none      Deformity: none      Wrist - prior incision: none    Left      Erythema: none      Ecchymosis: none      Edema: none      Deformity: none      Wrist - prior incision: none    Palpation    Left      Thumb tenderness to palpation: metacarpophalangeal joint      Thumb tenderness to palpation comment: Tenderness over the ulnar collateral and radial collateral ligaments.    Range of Motion     Left Hand      Thumb interphalangeal: limited        Thumb metacarpal phalangeal joint: limited (Stiffness noted on valgus and varus stress test)       Thumb carpometacarpal joint: limited      Right Wrist      Right wrist range of motion is normal.      Left Wrist      Left wrist range of motion " is normal.      Range of motion additional comments: Able to make a composite fist.  Left thumb movement is limited secondary to pain    Neurovascular    Right       Radial pulse: normal      Capillary refill: brisk and <3 sec      Ulnar nerve sensory distribution: normal      Median nerve sensory distribution: normal      Superficial radial nerve sensory distribution: normal    Left       Radial pulse: normal      Capillary refill: brisk and <3 sec      Ulnar nerve sensory distribution: normal      Median nerve sensory distribution: normal      Superficial radial nerve sensory distribution: normal    Special Tests    Special tests additional comments: No laxity noted on left thumb UCL and RCL valgus and varus stress test. +TTP on RCL/uCL of left thumb. Stiffness noted upon flexion with limited flexion ROM.     General    Labored breathing: no    Psychiatric: normal mood and affect    Neurological: oriented x3    Skin: intact    Diagnostics Review: X-Ray: Reviewed  Left hand x-rays   02/14/2024  FINDINGS:  Bones are well mineralized.  Slight ulnar minus variance.  No displaced fracture, dislocation or destructive osseous process.  Joint spaces appear relatively maintained.  No subcutaneous emphysema or radiodense retained foreign body.     Impression:     No acute displaced fracture-dislocation identified.       Assessment:       No diagnosis found.    Plan:       Henry was seen today for pain.    Diagnoses and all orders for this visit:    Complete tear of radial collateral ligament of interphalangeal joint of thumb    Sprain of metacarpophalangeal (MCP) joint of left thumb, initial encounter         MRI of the left thumb was discussed with patient in clinic today.  MRI resulted in in intra-articular subarticular fracture of the proximal phalanx and 1st MCP joint and also acute full-thickness tear of RCL and acute high grade tear/sprain of the UCL of the thumb.  Left thumb was noted to be very stiff during  examination however his right thumb was also stiff.  Patient has been in a left thumb spica brace.   I had a discussion about surgical intervention for his left thumb to repair RCL and also evaluate his UCL for possible repair if needed.  Patient agreed to proceed with surgical intervention.  Risks and benefits were discussed.  Surgical consents were signed.    All questions were answered.   Continue wearing thumb spica brace until surgery.

## 2024-02-27 NOTE — PROGRESS NOTES
Subjective:       Patient ID: Henry Pham is a 23 y.o. male.    Chief Complaint: Pain of the Left Hand      HPI 02/27/2024  Mr. Pham is here for a FU for left thumb/hand after sustaining a FOOSH on 2/11/2024. He reports he continue to have pain in left MCP radial and ulnar collateral aspect of thumb. He denies any numbness or tingling. Admits to wearing his thumb spica brace full time.         HPI  02/14/2024   Patient reports to clinic today after sustaining a fall on a right hand outstretched after slipping on beads at the [email or web address].  He reported most his pain to be in the anatomical snuffbox.  He went to the emergency room the following day where x-rays revealed no fracture.  He was placed in a thumb spica brace with concerns of a scaphoid fracture and referred to our clinic for further evaluation.    On evaluation today patient presents with a prefabricated thumb spica brace.  He states he is continued pain but is localized over the MCP joint of the thumb.  He denies any numbness or tingling.  He denies any previous surgeries or trauma to the wrist or hand.  States he is able to flex at the IP joint but notes stiffness.    Past Medical History:   Diagnosis Date    Allergy     seasonal    Asthma, not well controlled     Basal cell carcinoma 2004    right shoulder    Constipation - functional     Cough     Hypothyroidism     Obesity (BMI 30-39.9)     Wheezing      Past Surgical History:   Procedure Laterality Date    TONSILLECTOMY       Family History   Problem Relation Age of Onset    Other Mother         mitral valve prolapse    Thyroid disease Mother     Melanoma Cousin     Eczema Cousin     No Known Problems Father     No Known Problems Sister     No Known Problems Brother     Hearing loss Paternal Grandmother     Hearing loss Paternal Grandfather     Heart disease Other         cardiomyopathy    Thyroid disease Maternal Aunt     Thyroid disease Maternal Grandmother     No Known Problems  "Maternal Uncle     No Known Problems Paternal Aunt     No Known Problems Paternal Uncle     No Known Problems Maternal Grandfather     Amblyopia Neg Hx     Blindness Neg Hx     Cataracts Neg Hx     Diabetes Neg Hx     Glaucoma Neg Hx     Retinal detachment Neg Hx     Strabismus Neg Hx     Psoriasis Neg Hx     Lupus Neg Hx     Acne Neg Hx     Cancer Neg Hx     Hypertension Neg Hx     Macular degeneration Neg Hx     Stroke Neg Hx      Social History     Socioeconomic History    Marital status: Single   Tobacco Use    Smoking status: Never    Smokeless tobacco: Never   Substance and Sexual Activity    Alcohol use: No    Drug use: No    Sexual activity: Not Currently     Partners: Female   Social History Narrative    Preferred name "Marin"    Lives with mom (Dora) and step dad.  Student at [employer]                             Social Determinants of Health     Financial Resource Strain: Low Risk  (2/5/2024)    Overall Financial Resource Strain (CARDIA)     Difficulty of Paying Living Expenses: Not very hard   Food Insecurity: No Food Insecurity (2/5/2024)    Hunger Vital Sign     Worried About Running Out of Food in the Last Year: Never true     Ran Out of Food in the Last Year: Never true   Transportation Needs: No Transportation Needs (2/5/2024)    PRAPARE - Transportation     Lack of Transportation (Medical): No     Lack of Transportation (Non-Medical): No   Physical Activity: Sufficiently Active (2/5/2024)    Exercise Vital Sign     Days of Exercise per Week: 5 days     Minutes of Exercise per Session: 60 min   Stress: No Stress Concern Present (2/5/2024)    [place] [employer]ational [employer] - Occupational Stress Questionnaire     Feeling of Stress : Not at all   Social Connections: Unknown (2/5/2024)    Social Connection and Isolation Panel [NHANES]     Frequency of Communication with Friends and Family: More than three times a week     Frequency of Social Gatherings with Friends and Family: Three times a " "week     Active Member of Clubs or Organizations: No     Attends Club or Organization Meetings: Never     Marital Status: Never    Housing Stability: Low Risk  (2/5/2024)    Housing Stability Vital Sign     Unable to Pay for Housing in the Last Year: No     Number of Places Lived in the Last Year: 2     Unstable Housing in the Last Year: No       Current Outpatient Medications   Medication Sig Dispense Refill    cholecalciferol, vitamin D3, 1,250 mcg (50,000 unit) Tab Take 1,250 mcg by mouth every 7 days. 8 tablet 0    levothyroxine (SYNTHROID) 112 MCG tablet Take 1 tablet (112 mcg total) by mouth before breakfast. 30 tablet 11     No current facility-administered medications for this visit.     Review of patient's allergies indicates:  No Known Allergies    Review of Systems        Objective:      Vitals:    02/27/24 0926   Weight: 112.5 kg (248 lb 0.3 oz)   Height: 5' 11" (1.803 m)     Physical Exam  Cardiovascular:      Pulses:           Radial pulses are Normal on the right side and Normal on the left side.   Skin:     General: Skin is intact.   Psychiatric:         Mood and Affect: Mood and affect normal.       Hand/Wrist Musculoskeletal Exam    Inspection    Right      Erythema: none      Ecchymosis: none      Edema: none      Deformity: none      Wrist - prior incision: none    Left      Erythema: none      Ecchymosis: none      Edema: none      Deformity: none      Wrist - prior incision: none    Palpation    Left      Thumb tenderness to palpation: metacarpophalangeal joint      Thumb tenderness to palpation comment: Tenderness over the ulnar collateral and radial collateral ligaments.    Range of Motion     Left Hand      Thumb interphalangeal: limited        Thumb metacarpal phalangeal joint: limited (Stiffness noted on valgus and varus stress test)       Thumb carpometacarpal joint: limited      Right Wrist      Right wrist range of motion is normal.      Left Wrist      Left wrist range of motion " is normal.      Range of motion additional comments: Able to make a composite fist.  Left thumb movement is limited secondary to pain    Neurovascular    Right       Radial pulse: normal      Capillary refill: brisk and <3 sec      Ulnar nerve sensory distribution: normal      Median nerve sensory distribution: normal      Superficial radial nerve sensory distribution: normal    Left       Radial pulse: normal      Capillary refill: brisk and <3 sec      Ulnar nerve sensory distribution: normal      Median nerve sensory distribution: normal      Superficial radial nerve sensory distribution: normal    Special Tests    Special tests additional comments: No laxity noted on left thumb UCL and RCL valgus and varus stress test. +TTP on RCL/uCL of left thumb. Stiffness noted upon flexion with limited flexion ROM.     General    Labored breathing: no    Psychiatric: normal mood and affect    Neurological: oriented x3    Skin: intact    Diagnostics Review: X-Ray: Reviewed  Left hand x-rays   02/14/2024  FINDINGS:  Bones are well mineralized.  Slight ulnar minus variance.  No displaced fracture, dislocation or destructive osseous process.  Joint spaces appear relatively maintained.  No subcutaneous emphysema or radiodense retained foreign body.     Impression:     No acute displaced fracture-dislocation identified.       Assessment:       No diagnosis found.    Plan:       Henry was seen today for pain.    Diagnoses and all orders for this visit:    Complete tear of radial collateral ligament of interphalangeal joint of thumb    Sprain of metacarpophalangeal (MCP) joint of left thumb, initial encounter         MRI of the left thumb was discussed with patient in clinic today.  MRI resulted in in intra-articular subarticular fracture of the proximal phalanx and 1st MCP joint and also acute full-thickness tear of RCL and acute high grade tear/sprain of the UCL of the thumb.  Left thumb was noted to be very stiff during  examination however his right thumb was also stiff.  Patient has been in a left thumb spica brace.   I had a discussion about surgical intervention for his left thumb to repair RCL and also evaluate his UCL for possible repair if needed.  Patient agreed to proceed with surgical intervention.  Risks and benefits were discussed.  Surgical consents were signed.    All questions were answered.   Continue wearing thumb spica brace until surgery.

## 2024-02-29 ENCOUNTER — ANESTHESIA EVENT (OUTPATIENT)
Dept: SURGERY | Facility: HOSPITAL | Age: 24
End: 2024-02-29
Payer: COMMERCIAL

## 2024-02-29 VITALS
DIASTOLIC BLOOD PRESSURE: 78 MMHG | HEIGHT: 68 IN | WEIGHT: 229 LBS | BODY MASS INDEX: 34.71 KG/M2 | SYSTOLIC BLOOD PRESSURE: 120 MMHG

## 2024-02-29 LAB
HDLC SERPL-MCNC: 21 MG/DL
POC CHOLESTEROL, LDL (DOCK): 89 MG/DL
POC CHOLESTEROL, TOTAL: 133 MG/DL
POC GLUCOSE, FASTING: 88 MG/DL (ref 60–110)
TRIGL SERPL-MCNC: 126 MG/DL

## 2024-03-01 ENCOUNTER — TELEPHONE (OUTPATIENT)
Dept: ORTHOPEDICS | Facility: CLINIC | Age: 24
End: 2024-03-01
Payer: COMMERCIAL

## 2024-03-01 RX ORDER — IBUPROFEN 600 MG/1
600 TABLET ORAL 3 TIMES DAILY PRN
Qty: 45 TABLET | Refills: 0 | Status: SHIPPED | OUTPATIENT
Start: 2024-03-01 | End: 2024-03-01

## 2024-03-01 RX ORDER — IBUPROFEN 600 MG/1
600 TABLET ORAL 3 TIMES DAILY PRN
Qty: 45 TABLET | Refills: 0 | Status: SHIPPED | OUTPATIENT
Start: 2024-03-01

## 2024-03-01 RX ORDER — ACETAMINOPHEN 500 MG
1000 TABLET ORAL 2 TIMES DAILY PRN
Qty: 50 TABLET | Refills: 0 | Status: SHIPPED | OUTPATIENT
Start: 2024-03-01

## 2024-03-01 RX ORDER — OXYCODONE AND ACETAMINOPHEN 5; 325 MG/1; MG/1
1 TABLET ORAL
Qty: 10 TABLET | Refills: 0 | Status: SHIPPED | OUTPATIENT
Start: 2024-03-01 | End: 2024-03-01

## 2024-03-01 RX ORDER — OXYCODONE AND ACETAMINOPHEN 5; 325 MG/1; MG/1
1 TABLET ORAL
Qty: 10 TABLET | Refills: 0 | Status: SHIPPED | OUTPATIENT
Start: 2024-03-01

## 2024-03-01 RX ORDER — ACETAMINOPHEN 500 MG
1000 TABLET ORAL 2 TIMES DAILY PRN
Qty: 50 TABLET | Refills: 0 | Status: SHIPPED | OUTPATIENT
Start: 2024-03-01 | End: 2024-03-01

## 2024-03-01 NOTE — TELEPHONE ENCOUNTER
Spoke c pt. Informed pt of 8:30 a.m. arrival time for 03/04/24 surgery at the [facility]. Reminded pt of NPO status & PO appt. Pt expressed understanding & was thankful.

## 2024-03-03 DIAGNOSIS — S63.642A: ICD-10-CM

## 2024-03-03 RX ORDER — SODIUM CHLORIDE 9 MG/ML
INJECTION, SOLUTION INTRAVENOUS CONTINUOUS
Status: CANCELLED | OUTPATIENT
Start: 2024-03-03

## 2024-03-03 RX ORDER — MUPIROCIN 20 MG/G
OINTMENT TOPICAL
Status: CANCELLED | OUTPATIENT
Start: 2024-03-03

## 2024-03-03 NOTE — PROGRESS NOTES
Patient seen and examined    Complete tear of radial collateral ligament of interphalangeal joint of thumb     Sprain of metacarpophalangeal (MCP) joint of left thumb, initial encounter           MRI of the left thumb was discussed with patient in clinic today.  MRI resulted in in intra-articular subarticular fracture of the proximal phalanx and 1st MCP joint and also acute full-thickness tear of RCL and acute high grade tear/sprain of the UCL of the thumb.  Left thumb was noted to be very stiff during examination however his right thumb was also stiff.  Patient has been in a left thumb spica brace.   I had a discussion about surgical intervention for his left thumb to repair RCL and also evaluate his UCL for possible repair if needed.  Patient agreed to proceed with surgical intervention.  Risks and benefits were discussed.  Surgical consents were signed.    All questions were answered.   Continue wearing thumb spica brace until surgery.

## 2024-03-04 ENCOUNTER — ANESTHESIA (OUTPATIENT)
Dept: SURGERY | Facility: HOSPITAL | Age: 24
End: 2024-03-04
Payer: COMMERCIAL

## 2024-03-04 ENCOUNTER — HOSPITAL ENCOUNTER (OUTPATIENT)
Facility: HOSPITAL | Age: 24
Discharge: HOME | End: 2024-03-04
Attending: ORTHOPAEDIC SURGERY | Admitting: ORTHOPAEDIC SURGERY
Payer: COMMERCIAL

## 2024-03-04 VITALS
OXYGEN SATURATION: 97 % | HEART RATE: 72 BPM | TEMPERATURE: 97 F | RESPIRATION RATE: 23 BRPM | HEIGHT: 71 IN | SYSTOLIC BLOOD PRESSURE: 108 MMHG | DIASTOLIC BLOOD PRESSURE: 65 MMHG | BODY MASS INDEX: 34.72 KG/M2 | WEIGHT: 248 LBS

## 2024-03-04 DIAGNOSIS — S63.642A: ICD-10-CM

## 2024-03-04 PROCEDURE — 99900035 HC TECH TIME PER 15 MIN (STAT)

## 2024-03-04 PROCEDURE — 25000003 PHARM REV CODE 250: Performed by: NURSE ANESTHETIST, CERTIFIED REGISTERED

## 2024-03-04 PROCEDURE — 26540 REPAIR HAND JOINT: CPT | Mod: FA,,, | Performed by: ORTHOPAEDIC SURGERY

## 2024-03-04 PROCEDURE — 25000003 PHARM REV CODE 250: Performed by: ORTHOPAEDIC SURGERY

## 2024-03-04 PROCEDURE — 63600175 PHARM REV CODE 636 W HCPCS: Performed by: NURSE ANESTHETIST, CERTIFIED REGISTERED

## 2024-03-04 PROCEDURE — 27201423 OPTIME MED/SURG SUP & DEVICES STERILE SUPPLY: Performed by: ORTHOPAEDIC SURGERY

## 2024-03-04 PROCEDURE — 71000033 HC RECOVERY, INTIAL HOUR: Performed by: ORTHOPAEDIC SURGERY

## 2024-03-04 PROCEDURE — 27200750 HC INSULATED NEEDLE/ STIMUPLEX: Performed by: ANESTHESIOLOGY

## 2024-03-04 PROCEDURE — 64415 NJX AA&/STRD BRCH PLXS IMG: CPT | Mod: 59,LT | Performed by: ANESTHESIOLOGY

## 2024-03-04 PROCEDURE — D9220A PRA ANESTHESIA: Mod: ANES,,, | Performed by: ANESTHESIOLOGY

## 2024-03-04 PROCEDURE — 25000003 PHARM REV CODE 250: Performed by: STUDENT IN AN ORGANIZED HEALTH CARE EDUCATION/TRAINING PROGRAM

## 2024-03-04 PROCEDURE — 63600175 PHARM REV CODE 636 W HCPCS: Performed by: ANESTHESIOLOGY

## 2024-03-04 PROCEDURE — 36000707: Performed by: ORTHOPAEDIC SURGERY

## 2024-03-04 PROCEDURE — 37000009 HC ANESTHESIA EA ADD 15 MINS: Performed by: ORTHOPAEDIC SURGERY

## 2024-03-04 PROCEDURE — 71000015 HC POSTOP RECOV 1ST HR: Performed by: ORTHOPAEDIC SURGERY

## 2024-03-04 PROCEDURE — 25000003 PHARM REV CODE 250: Performed by: PHYSICIAN ASSISTANT

## 2024-03-04 PROCEDURE — D9220A PRA ANESTHESIA: Mod: CRNA,,, | Performed by: NURSE ANESTHETIST, CERTIFIED REGISTERED

## 2024-03-04 PROCEDURE — 36000706: Performed by: ORTHOPAEDIC SURGERY

## 2024-03-04 PROCEDURE — 94761 N-INVAS EAR/PLS OXIMETRY MLT: CPT

## 2024-03-04 PROCEDURE — 37000008 HC ANESTHESIA 1ST 15 MINUTES: Performed by: ORTHOPAEDIC SURGERY

## 2024-03-04 PROCEDURE — C1713 ANCHOR/SCREW BN/BN,TIS/BN: HCPCS | Performed by: ORTHOPAEDIC SURGERY

## 2024-03-04 PROCEDURE — 63600175 PHARM REV CODE 636 W HCPCS: Performed by: PHYSICIAN ASSISTANT

## 2024-03-04 DEVICE — NANO CORKSCREW FT, TI, W 3-0 FW
Type: IMPLANTABLE DEVICE | Site: THUMB | Status: FUNCTIONAL
Brand: ARTHREX®

## 2024-03-04 RX ORDER — ONDANSETRON HYDROCHLORIDE 2 MG/ML
INJECTION, SOLUTION INTRAVENOUS
Status: DISCONTINUED | OUTPATIENT
Start: 2024-03-04 | End: 2024-03-04

## 2024-03-04 RX ORDER — ROPIVACAINE HYDROCHLORIDE 5 MG/ML
INJECTION, SOLUTION EPIDURAL; INFILTRATION; PERINEURAL
Status: COMPLETED | OUTPATIENT
Start: 2024-03-04 | End: 2024-03-04

## 2024-03-04 RX ORDER — METHOCARBAMOL 500 MG/1
1000 TABLET, FILM COATED ORAL ONCE
Status: DISCONTINUED | OUTPATIENT
Start: 2024-03-04 | End: 2024-03-04 | Stop reason: HOSPADM

## 2024-03-04 RX ORDER — MIDAZOLAM HYDROCHLORIDE 1 MG/ML
1 INJECTION INTRAMUSCULAR; INTRAVENOUS
Status: DISPENSED | OUTPATIENT
Start: 2024-03-04

## 2024-03-04 RX ORDER — METOCLOPRAMIDE HYDROCHLORIDE 5 MG/ML
5 INJECTION INTRAMUSCULAR; INTRAVENOUS EVERY 6 HOURS PRN
Status: CANCELLED | OUTPATIENT
Start: 2024-03-04

## 2024-03-04 RX ORDER — ONDANSETRON HYDROCHLORIDE 2 MG/ML
4 INJECTION, SOLUTION INTRAVENOUS EVERY 12 HOURS PRN
Status: CANCELLED | OUTPATIENT
Start: 2024-03-04

## 2024-03-04 RX ORDER — BACITRACIN ZINC 500 UNIT/G
OINTMENT (GRAM) TOPICAL
Status: DISCONTINUED | OUTPATIENT
Start: 2024-03-04 | End: 2024-03-04 | Stop reason: HOSPADM

## 2024-03-04 RX ORDER — MUPIROCIN 20 MG/G
OINTMENT TOPICAL
Status: DISCONTINUED | OUTPATIENT
Start: 2024-03-04 | End: 2024-03-04 | Stop reason: HOSPADM

## 2024-03-04 RX ORDER — ACETAMINOPHEN 500 MG
1000 TABLET ORAL
Status: COMPLETED | OUTPATIENT
Start: 2024-03-04 | End: 2024-03-04

## 2024-03-04 RX ORDER — HYDROCODONE BITARTRATE AND ACETAMINOPHEN 5; 325 MG/1; MG/1
1 TABLET ORAL EVERY 4 HOURS PRN
Status: CANCELLED | OUTPATIENT
Start: 2024-03-04

## 2024-03-04 RX ORDER — HYDROCODONE BITARTRATE AND ACETAMINOPHEN 10; 325 MG/1; MG/1
1 TABLET ORAL EVERY 4 HOURS PRN
Status: CANCELLED | OUTPATIENT
Start: 2024-03-04

## 2024-03-04 RX ORDER — SODIUM CHLORIDE 9 MG/ML
INJECTION, SOLUTION INTRAVENOUS CONTINUOUS
Status: DISCONTINUED | OUTPATIENT
Start: 2024-03-04 | End: 2024-03-04 | Stop reason: HOSPADM

## 2024-03-04 RX ORDER — MIDAZOLAM HYDROCHLORIDE 1 MG/ML
INJECTION, SOLUTION INTRAMUSCULAR; INTRAVENOUS
Status: DISCONTINUED | OUTPATIENT
Start: 2024-03-04 | End: 2024-03-04

## 2024-03-04 RX ORDER — DEXAMETHASONE SODIUM PHOSPHATE 4 MG/ML
INJECTION, SOLUTION INTRA-ARTICULAR; INTRALESIONAL; INTRAMUSCULAR; INTRAVENOUS; SOFT TISSUE
Status: DISCONTINUED | OUTPATIENT
Start: 2024-03-04 | End: 2024-03-04

## 2024-03-04 RX ORDER — SODIUM CHLORIDE 0.9 % (FLUSH) 0.9 %
10 SYRINGE (ML) INJECTION
Status: DISCONTINUED | OUTPATIENT
Start: 2024-03-04 | End: 2024-03-04 | Stop reason: HOSPADM

## 2024-03-04 RX ORDER — DEXMEDETOMIDINE HYDROCHLORIDE 100 UG/ML
INJECTION, SOLUTION INTRAVENOUS
Status: DISCONTINUED | OUTPATIENT
Start: 2024-03-04 | End: 2024-03-04

## 2024-03-04 RX ORDER — MUPIROCIN 20 MG/G
OINTMENT TOPICAL 2 TIMES DAILY
Status: CANCELLED | OUTPATIENT
Start: 2024-03-04 | End: 2024-03-06

## 2024-03-04 RX ORDER — PROPOFOL 10 MG/ML
VIAL (ML) INTRAVENOUS CONTINUOUS PRN
Status: DISCONTINUED | OUTPATIENT
Start: 2024-03-04 | End: 2024-03-04

## 2024-03-04 RX ORDER — PROPOFOL 10 MG/ML
VIAL (ML) INTRAVENOUS
Status: DISCONTINUED | OUTPATIENT
Start: 2024-03-04 | End: 2024-03-04

## 2024-03-04 RX ORDER — FENTANYL CITRATE 50 UG/ML
100 INJECTION, SOLUTION INTRAMUSCULAR; INTRAVENOUS
Status: DISPENSED | OUTPATIENT
Start: 2024-03-04

## 2024-03-04 RX ORDER — ACETAMINOPHEN 325 MG/1
650 TABLET ORAL EVERY 4 HOURS PRN
Status: CANCELLED | OUTPATIENT
Start: 2024-03-04

## 2024-03-04 RX ADMIN — ONDANSETRON 4 MG: 2 INJECTION INTRAMUSCULAR; INTRAVENOUS at 10:03

## 2024-03-04 RX ADMIN — DEXTROSE MONOHYDRATE 2 G: 50 INJECTION, SOLUTION INTRAVENOUS at 10:03

## 2024-03-04 RX ADMIN — PROPOFOL 30 MG: 10 INJECTION, EMULSION INTRAVENOUS at 10:03

## 2024-03-04 RX ADMIN — MUPIROCIN: 20 OINTMENT TOPICAL at 08:03

## 2024-03-04 RX ADMIN — ROPIVACAINE HYDROCHLORIDE 30 ML: 5 INJECTION EPIDURAL; INFILTRATION; PERINEURAL at 09:03

## 2024-03-04 RX ADMIN — SODIUM CHLORIDE: 9 INJECTION, SOLUTION INTRAVENOUS at 08:03

## 2024-03-04 RX ADMIN — DEXMEDETOMIDINE 8 MCG: 100 INJECTION, SOLUTION, CONCENTRATE INTRAVENOUS at 10:03

## 2024-03-04 RX ADMIN — ACETAMINOPHEN 1000 MG: 500 TABLET ORAL at 08:03

## 2024-03-04 RX ADMIN — FENTANYL CITRATE 100 MCG: 50 INJECTION INTRAMUSCULAR; INTRAVENOUS at 09:03

## 2024-03-04 RX ADMIN — MIDAZOLAM 2 MG: 1 INJECTION INTRAMUSCULAR; INTRAVENOUS at 09:03

## 2024-03-04 RX ADMIN — PROPOFOL 125 MCG/KG/MIN: 10 INJECTION, EMULSION INTRAVENOUS at 10:03

## 2024-03-04 RX ADMIN — DEXAMETHASONE SODIUM PHOSPHATE 4 MG: 4 INJECTION, SOLUTION INTRAMUSCULAR; INTRAVENOUS at 10:03

## 2024-03-04 NOTE — PLAN OF CARE
VSS.  Patient tolerating oral liquids without difficulty.   No apparent s&s of distress noted at this time, no complaints voiced at this time.   Discharge instructions reviewed with patient/family/friend with good verbal feedback received.   Post op medications bedside, DOTTIE sandhu.  Patient ready for discharge.

## 2024-03-04 NOTE — ANESTHESIA PROCEDURE NOTES
Left supraclavicular single shot    Patient location during procedure: pre-op   Block not for primary anesthetic.  Reason for block: at surgeon's request and post-op pain management   Post-op Pain Location: Left hand pain   Start time: 3/4/2024 9:31 AM  Timeout: 3/4/2024 9:30 AM   End time: 3/4/2024 9:36 AM    Staffing  Authorizing Provider: Anjelica Freeman MD  Performing Provider: Anjelica Freeman MD    Staffing  Performed by: Anjelica Freeman MD  Authorized by: Anjelica Freeman MD    Preanesthetic Checklist  Completed: patient identified, IV checked, site marked, risks and benefits discussed, surgical consent, monitors and equipment checked, pre-op evaluation and timeout performed  Peripheral Block  Patient position: supine  Prep: ChloraPrep  Patient monitoring: heart rate, cardiac monitor, continuous pulse ox, continuous capnometry and frequent blood pressure checks  Block type: supraclavicular  Laterality: left  Injection technique: single shot  Needle  Needle type: Stimuplex   Needle gauge: 22 G  Needle length: 2 in  Needle localization: anatomical landmarks and ultrasound guidance   -ultrasound image captured on disc.  Assessment  Injection assessment: negative aspiration, negative parasthesia and local visualized surrounding nerve  Paresthesia pain: none  Heart rate change: no  Slow fractionated injection: yes    Medications:    Medications: ropivacaine (NAROPIN) injection 0.5% - Perineural   30 mL - 3/4/2024 9:34:00 AM    Additional Notes  VSS.  [facility] RN monitoring vitals throughout procedure.  Patient tolerated procedure well.     With 1:200,000 epi

## 2024-03-04 NOTE — TRANSFER OF CARE
"Anesthesia Transfer of Care Note    Patient: Henry Pham    Procedure(s) Performed: Procedure(s) (LRB):  LEFT TUMB RCL REPAIR (Left)    Patient location: PACU    Anesthesia Type: MAC and regional    Transport from OR: Transported from OR on 6-10 L/min O2 by face mask with adequate spontaneous ventilation    Post pain: adequate analgesia    Post assessment: no apparent anesthetic complications    Post vital signs: stable    Level of consciousness: alert, awake and oriented    Nausea/Vomiting: no nausea/vomiting    Complications: none    Transfer of care protocol was followed      Last vitals: Visit Vitals  /84 (BP Location: Right arm, Patient Position: Lying)   Pulse 69   Temp 36.8 °C (98.3 °F) (Oral)   Resp 16   Ht 5' 11" (1.803 m)   Wt 112.5 kg (248 lb)   SpO2 96%   BMI 34.59 kg/m²     "

## 2024-03-04 NOTE — BRIEF OP NOTE
[place] - Surgery (Hospital)  Brief Operative Note    Surgery Date: 3/4/2024     Surgeon(s) and Role:     * Carolina Smart MD - Primary    Assisting Surgeon: None    Pre-op Diagnosis:  Sprain of metacarpophalangeal (MCP) joint of left thumb, initial encounter [S63.642A]    Post-op Diagnosis:  Post-Op Diagnosis Codes:     * Sprain of metacarpophalangeal (MCP) joint of left thumb, initial encounter [S63.642A]    Procedure(s) (LRB):  LEFT TUMB RCL REPAIR (Left)    Anesthesia: Regional    Operative Findings: see op note    Estimated Blood Loss: * No values recorded between 3/4/2024 10:31 AM and 3/4/2024 11:05 AM *         Specimens:   Specimen (24h ago, onward)      None              Discharge Note    OUTCOME: Patient tolerated treatment/procedure well without complication and is now ready for discharge.    DISPOSITION: Home or Self Care    FINAL DIAGNOSIS:  Rupture of radial collateral ligament of left thumb    FOLLOWUP: In clinic    DISCHARGE INSTRUCTIONS:    Discharge Procedure Orders   Diet general     Sponge bath only until clinic visit     Keep surgical extremity elevated     Lifting restrictions   Order Comments: NWB MARTINE     No driving, operating heavy equipment or signing legal documents while taking pain medication.     Leave dressing on - Keep it clean, dry, and intact until clinic visit     Call MD for:  temperature >100.4     Call MD for:  persistent nausea and vomiting     Call MD for:  severe uncontrolled pain     Call MD for:  difficulty breathing, headache or visual disturbances     Call MD for:  redness, tenderness, or signs of infection (pain, swelling, redness, odor or green/yellow discharge around incision site)     Call MD for:  hives     Call MD for:  persistent dizziness or light-headedness     Call MD for:  extreme fatigue     Shower on day dressing removed (No bath)

## 2024-03-04 NOTE — PROGRESS NOTES
Pre op completed.  Patient belongings given to mother. Bed in lowest position. Call light within reach. Family at beside. DME not needed. Bear hugger refused.

## 2024-03-04 NOTE — ANESTHESIA PREPROCEDURE EVALUATION
03/04/2024  Henry Pham is a 23 y.o., male.    Procedure: LEFT TUMB RCL REPAIR AND ANY OTHER INICATED PROCEDURE (Left: Finger) - MAC/REGIONAL   Anesthesia type: Regional   Diagnosis: Sprain of metacarpophalangeal (MCP) joint of left thumb, initial encounter [S63.642A]     Patient Active Problem List   Diagnosis    Asthma, not well controlled    Myopia    Body mass index, pediatric, greater than or equal to 95th percentile for age    Facial cellulitis    CRP elevated    Dental abscess    Wears contact lenses    Hypothyroidism    Encounter for hepatitis C screening test for low risk patient    Fatigue    Obesity (BMI 35.0-39.9 without comorbidity)    Vitamin D deficiency     Past Surgical History:   Procedure Laterality Date    TONSILLECTOMY       Medication List with Changes/Refills   New Medications    ACETAMINOPHEN (TYLENOL) 500 MG TABLET    Take 2 tablets (1,000 mg total) by mouth 2 (two) times daily as needed for Pain. Begin post op day 3.    IBUPROFEN (ADVIL,MOTRIN) 600 MG TABLET    Take 1 tablet (600 mg total) by mouth 3 (three) times daily as needed for Pain. Bedside Delivery    OXYCODONE-ACETAMINOPHEN (PERCOCET) 5-325 MG PER TABLET    Take 1 tablet by mouth every 4 to 6 hours as needed for Pain ((moderate-severe)). PO day 1-2   Current Medications    CHOLECALCIFEROL, VITAMIN D3, 1,250 MCG (50,000 UNIT) TAB    Take 1,250 mcg by mouth every 7 days.    LEVOTHYROXINE (SYNTHROID) 112 MCG TABLET    Take 1 tablet (112 mcg total) by mouth before breakfast.       Pre-op Assessment    I have reviewed the Patient Summary Reports.     I have reviewed the Nursing Notes. I have reviewed the NPO Status.   I have reviewed the Medications.     Review of Systems  Social:  Non-Smoker, No Alcohol Use           Physical Exam    Airway:  Mallampati: II / II  Mouth Opening: Normal  TM Distance:  Normal  Tongue: Normal  Neck ROM: Normal ROM    Dental:  Intact    Anesthesia Plan  Type of Anesthesia, risks & benefits discussed:    Anesthesia Type: Gen Natural Airway, Regional  Intra-op Monitoring Plan: Standard ASA Monitors  Post Op Pain Control Plan: multimodal analgesia, peripheral nerve block and IV/PO Opioids PRN  Induction:  IV  Airway Plan: Direct  Informed Consent: Informed consent signed with the Patient and all parties understand the risks and agree with anesthesia plan.  All questions answered.   ASA Score: 2    Ready For Surgery From Anesthesia Perspective.   .

## 2024-03-06 NOTE — ANESTHESIA POSTPROCEDURE EVALUATION
Anesthesia Post Evaluation    Patient: Henry Pham    Procedure(s) Performed: Procedure(s) (LRB):  LEFT TUMB RCL REPAIR (Left)    Final Anesthesia Type: general      Patient location during evaluation: PACU  Patient participation: Yes- Able to Participate  Level of consciousness: awake and alert and oriented  Post-procedure vital signs: reviewed and stable  Pain management: adequate  Airway patency: patent    PONV status at discharge: No PONV  Anesthetic complications: no      Cardiovascular status: hemodynamically stable  Respiratory status: nasal cannula  Hydration status: euvolemic  Follow-up not needed.              Vitals Value Taken Time   /65 03/04/24 1146   Temp 36.2 °C (97.1 °F) 03/04/24 1130   Pulse 68 03/04/24 1153   Resp 21 03/04/24 1153   SpO2 99 % 03/04/24 1153   Vitals shown include unvalidated device data.      Event Time   Out of Recovery 11:36:00         Pain/Ana Score: No data recorded

## 2024-03-06 NOTE — OP NOTE
[facility] ([facility])  Surgery Department  Operative Note    SUMMARY     Date of Procedure: 3/4/2024     Procedure: Procedure(s) (LRB):  LEFT TUMB RCL REPAIR (Left)     Surgeon(s) and Role:     * Carolina Smart MD - Primary    Assisting Surgeon: Abelino    Pre-Operative Diagnosis: Sprain of metacarpophalangeal (MCP) joint of left thumb, initial encounter [S63.424B]    Post-Operative Diagnosis: Post-Op Diagnosis Codes:     * Sprain of metacarpophalangeal (MCP) joint of left thumb, initial encounter [S63.814A]    Anesthesia: Regional    Technical Procedures Used: surgery    Description of the Findings of the Procedure:  Indication for procedure Mr Pham is a 23-year-old male who sustained an injury to his left thumb this was at a [employer] ball since then he has had significant pain swelling MRI shows radial collateral ligament injury of note he has no motion of his MCP joint in both his left and right thumbs 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 and this was shown to the patient while he was in the clinic because he had no motion preoperatively and then I showed that his contralateral side also had no motion this would not be repaired at the end of the surgery but this would be stability and again he had significant pain after much discussion with the patient the patient's mother elected for surgical intervention risks and benefits were explained to the patient in clinic consents were signed in clinic     Procedure in detail the correct site was marked with the patient's participation in the holding area the patient underwent regional anesthesia was brought to the operating room placed in supine position underwent MAC anesthesia well-padded nonsterile tourniquet was placed on the left upper extremity left upper extremities prepped draped normal sterile fashion a time-out was conducted for the correct procedure to be indicated IV antibiotics given patient preoperatively incision was marked out over the radial side of  the MCP joint the arm was exsanguinated an Esmarch tourniquet was insufflated 250 mmHg incision was made careful dissection down to the MCP joint of note you could see the radial collateral ligament which was completely ripped off and balled up therefore the decision was made to repair it Arthrex anchor was placed on the base of P1 this was done under C-arm fluoroscopy it was drilled appropriately and the corkscrew anchor was placed this was a Leelee anchor was suture suture was then used to repair the collateral ligament again patient could not be placed in much flexion could he had no flexion so it will be left in extension once that was repaired we assessed the ulnar collateral was completely stable and again we could not assess it at 30° we could only assess it at 0° Vicryl closed the deep tissue Monocryl closed the skin Dermabond was placed sterile dressing was applied patient was placed in a well-padded thumb spica splint tolerated the procedure well was brought to cover room in stable condition     Postop plans patient keep the dressing clean dry and intact will see the patient back in 2 weeks' time he will be assessed cast to be placed for 2 weeks after that and then therapy to be initiated    Significant Surgical Tasks Conducted by the Assistant(s), if Applicable: retraction    Complications: No    Estimated Blood Loss (EBL): * No values recorded between 3/4/2024 10:31 AM and 3/4/2024 11:05 AM *           Implants:   Implant Name Type Inv. Item Serial No.  Lot No. LRB No. Used Action   ANCHOR SUT 3-0 FW KWIRE 1.35MM - [id number]  ANCHOR SUT 3-0 FW KWIRE 1.35MM  [employer] [id number] Left 1 Implanted       Specimens:   Specimen (24h ago, onward)      None                    Condition: Good    Disposition: PACU - hemodynamically stable.    Attestation: I performed the procedure.    Discharge Note    SUMMARY     Admit Date: 3/4/2024    Discharge Date and Time: 3/4/2024 12:17 PM    Hospital Course  (synopsis of major diagnoses, care, treatment, and services provided during the course of the hospital stay): surgery     Final Diagnosis: Post-Op Diagnosis Codes:     * Sprain of metacarpophalangeal (MCP) joint of left thumb, initial encounter [S63.642A]    Disposition: Home or Self Care    Follow Up/Patient Instructions:     Medications:  Reconciled Home Medications:      Medication List        START taking these medications      acetaminophen 500 MG tablet  Commonly known as: TYLENOL  Take 2 tablets (1,000 mg total) by mouth 2 (two) times daily as needed for Pain. Begin post op day 3.     ibuprofen 600 MG tablet  Commonly known as: ADVIL,MOTRIN  Take 1 tablet (600 mg total) by mouth 3 (three) times daily as needed for Pain. Bedside Delivery     oxyCODONE-acetaminophen 5-325 mg per tablet  Commonly known as: PERCOCET  Take 1 tablet by mouth every 4 to 6 hours as needed for Pain ((moderate-severe)). PO day 1-2            CONTINUE taking these medications      cholecalciferol (vitamin D3) 1,250 mcg (50,000 unit) Tab  Take 1,250 mcg by mouth every 7 days.     levothyroxine 112 MCG tablet  Commonly known as: SYNTHROID  Take 1 tablet (112 mcg total) by mouth before breakfast.            Discharge Procedure Orders   Diet general     Sponge bath only until clinic visit     Keep surgical extremity elevated     Lifting restrictions   Order Comments: NWBLESSING LUE     No driving, operating heavy equipment or signing legal documents while taking pain medication.     Leave dressing on - Keep it clean, dry, and intact until clinic visit     Call MD for:  temperature >100.4     Call MD for:  persistent nausea and vomiting     Call MD for:  severe uncontrolled pain     Call MD for:  difficulty breathing, headache or visual disturbances     Call MD for:  redness, tenderness, or signs of infection (pain, swelling, redness, odor or green/yellow discharge around incision site)     Call MD for:  hives     Call MD for:  persistent dizziness  or light-headedness     Call MD for:  extreme fatigue     Shower on day dressing removed (No bath)

## 2024-03-18 ENCOUNTER — OFFICE VISIT (OUTPATIENT)
Dept: ORTHOPEDICS | Facility: CLINIC | Age: 24
End: 2024-03-18
Payer: COMMERCIAL

## 2024-03-18 VITALS — WEIGHT: 248 LBS | HEIGHT: 71 IN | BODY MASS INDEX: 34.72 KG/M2

## 2024-03-18 DIAGNOSIS — S63.642D RUPTURE OF RADIAL COLLATERAL LIGAMENT OF LEFT THUMB, SUBSEQUENT ENCOUNTER: ICD-10-CM

## 2024-03-18 DIAGNOSIS — Z98.890 POST-OPERATIVE STATE: Primary | ICD-10-CM

## 2024-03-18 DIAGNOSIS — R52 PAIN: Primary | ICD-10-CM

## 2024-03-18 PROCEDURE — 3044F HG A1C LEVEL LT 7.0%: CPT | Mod: CPTII,S$GLB,, | Performed by: SPECIALIST/TECHNOLOGIST

## 2024-03-18 PROCEDURE — 29085 APPL CAST HAND&LWR FOREARM: CPT | Mod: 58,LT,S$GLB, | Performed by: SPECIALIST/TECHNOLOGIST

## 2024-03-18 PROCEDURE — 99999 PR PBB SHADOW E&M-EST. PATIENT-LVL III: CPT | Mod: PBBFAC,,, | Performed by: SPECIALIST/TECHNOLOGIST

## 2024-03-18 PROCEDURE — 99024 POSTOP FOLLOW-UP VISIT: CPT | Mod: S$GLB,,, | Performed by: SPECIALIST/TECHNOLOGIST

## 2024-03-18 PROCEDURE — 1159F MED LIST DOCD IN RCRD: CPT | Mod: CPTII,S$GLB,, | Performed by: SPECIALIST/TECHNOLOGIST

## 2024-03-18 NOTE — PROGRESS NOTES
"Mr. Pham is here today for a post-operative visit.  He is 14 days status post Left Radial Collateral Ligament Repair of thumb by Dr. Smart on 3/4/24. He reports that he is minimal pain. Denies taking any pain medication.  He denies fever, chills, and sweats since the time of the surgery.     Physical exam:    Vitals:    03/18/24 1201   Weight: 112.5 kg (248 lb 0.3 oz)   Height: 5' 11" (1.803 m)   PainSc: 0-No pain     Vital signs are stable, patient is afebrile.  Patient is well dressed and well groomed, no acute distress.  Alert and oriented to person, place, and time.  Post op dressing taken down.  Incision is clean, dry and intact.  There is no erythema or exudate.  There is no sign of any infection. He is NVI. Sutures removed without difficulty. Able to make a composite fist.     Assessment:  s/p Left Radial Collateral Ligament Repair of thumb         Plan:  Henry was seen today for post-op evaluation.    Diagnoses and all orders for this visit:    Post-operative state  -     Ambulatory referral/consult to Physical/Occupational Therapy; Future    Rupture of radial collateral ligament of left thumb, subsequent encounter  -     Ambulatory referral/consult to Physical/Occupational Therapy; Future      - PO instruction reviewed and provided to patient  - Educated patient on HEP ROM of the Elbow, Wrist and Hand for 15 minutes.  - Transition patient to a Thumb Spica fiberglass cast  - Patient will f/u in 2 weeks with Cast off.   - Ordered therapy to begin in 2 weeks.     Landry Joshi PA-C, [facility]  [facility]  [facility] [place], [place]    Disclaimer: This note has been generated using voice-recognition software. There may be typographical errors that have been missed during proof-reading.   "

## 2024-04-02 ENCOUNTER — TELEPHONE (OUTPATIENT)
Dept: ORTHOPEDICS | Facility: CLINIC | Age: 24
End: 2024-04-02
Payer: COMMERCIAL

## 2024-04-04 ENCOUNTER — HOSPITAL ENCOUNTER (OUTPATIENT)
Dept: RADIOLOGY | Facility: OTHER | Age: 24
Discharge: HOME | End: 2024-04-04
Attending: SPECIALIST/TECHNOLOGIST
Payer: COMMERCIAL

## 2024-04-04 ENCOUNTER — CLINICAL SUPPORT (OUTPATIENT)
Dept: REHABILITATION | Facility: HOSPITAL | Age: 24
End: 2024-04-04
Payer: COMMERCIAL

## 2024-04-04 ENCOUNTER — OFFICE VISIT (OUTPATIENT)
Dept: ORTHOPEDICS | Facility: CLINIC | Age: 24
End: 2024-04-04
Payer: COMMERCIAL

## 2024-04-04 VITALS — HEIGHT: 71 IN | BODY MASS INDEX: 34.72 KG/M2 | WEIGHT: 248 LBS

## 2024-04-04 DIAGNOSIS — M79.645 THUMB PAIN, LEFT: Primary | ICD-10-CM

## 2024-04-04 DIAGNOSIS — R52 PAIN: ICD-10-CM

## 2024-04-04 DIAGNOSIS — Z98.890 POST-OPERATIVE STATE: Primary | ICD-10-CM

## 2024-04-04 DIAGNOSIS — M25.642 DECREASED RANGE OF MOTION OF LEFT THUMB: ICD-10-CM

## 2024-04-04 DIAGNOSIS — Z98.890 POST-OPERATIVE STATE: ICD-10-CM

## 2024-04-04 DIAGNOSIS — S63.642D RUPTURE OF RADIAL COLLATERAL LIGAMENT OF LEFT THUMB, SUBSEQUENT ENCOUNTER: ICD-10-CM

## 2024-04-04 DIAGNOSIS — S63.629A COMPLETE TEAR OF RADIAL COLLATERAL LIGAMENT OF INTERPHALANGEAL JOINT OF THUMB: ICD-10-CM

## 2024-04-04 DIAGNOSIS — R29.898 DECREASED GRIP STRENGTH OF LEFT HAND: ICD-10-CM

## 2024-04-04 PROCEDURE — 1159F MED LIST DOCD IN RCRD: CPT | Mod: CPTII,S$GLB,, | Performed by: SPECIALIST/TECHNOLOGIST

## 2024-04-04 PROCEDURE — 99999 PR PBB SHADOW E&M-EST. PATIENT-LVL III: CPT | Mod: PBBFAC,,, | Performed by: SPECIALIST/TECHNOLOGIST

## 2024-04-04 PROCEDURE — 73110 X-RAY EXAM OF WRIST: CPT | Mod: TC,FY,LT

## 2024-04-04 PROCEDURE — 97165 OT EVAL LOW COMPLEX 30 MIN: CPT

## 2024-04-04 PROCEDURE — 97110 THERAPEUTIC EXERCISES: CPT

## 2024-04-04 PROCEDURE — 99024 POSTOP FOLLOW-UP VISIT: CPT | Mod: S$GLB,,, | Performed by: SPECIALIST/TECHNOLOGIST

## 2024-04-04 PROCEDURE — 73110 X-RAY EXAM OF WRIST: CPT | Mod: 26,LT,, | Performed by: RADIOLOGY

## 2024-04-04 PROCEDURE — 3044F HG A1C LEVEL LT 7.0%: CPT | Mod: CPTII,S$GLB,, | Performed by: SPECIALIST/TECHNOLOGIST

## 2024-04-04 PROCEDURE — L3913 HFO W/O JOINTS CF: HCPCS

## 2024-04-04 NOTE — PLAN OF CARE
[facility] Occupational Therapy  Initial Evaluation     Date: 4/4/2024  Patient: Henry Pham  Chart Number: [id number]    Therapy Diagnosis:   1. Thumb pain, left        2. Post-operative state  Ambulatory referral/consult to Physical/Occupational Therapy      3. Rupture of radial collateral ligament of left thumb, subsequent encounter  Ambulatory referral/consult to Physical/Occupational Therapy      4. Decreased  strength of left hand        5. Decreased range of motion of left thumb          Medical Diagnosis: Z98.890 (ICD-10-CM) - Post-operative state S63.642D (ICD-10-CM) - Rupture of radial collateral ligament of left thumb, subsequent encounter    Referring Physician: Landry Joshi PA-C  Physician Orders: Eval and Treat  Note:   L RCL Repair  DOS: 3/4/24  12 Visits  [facility]  Date of Return to MD: 5/27/24    Date of Injury: 2/11/24  Date of Surgery: 3/4/24  Left thumb RCL repair with Arthrex anchor    Evaluation Date: 4/4/2024  Authorization Period: 3/18/24 - 3/18/25  Plan of Care Expiration: 5/27/24  Visit #/ Visits Authorized: 1 of 1  FOTO Completion: Initial eval (4/4/2024)  FOTO #2:  FOTO #3:    Time In: 10:20 am  Time Out: 11:20 am  Total Appointment Time (timed & untimed codes): 60 min    Precautions: Standard, strengthening/weightbearing    Subjective     History of Current Condition: Henry Pham is a 23 y.o. year old Right hand dominant male who slipped on beads during [place]. He went to the ER the next day and was told to apply ice. He then decided to follow up with Ortho MD and underwent MRI imaging - revealed thumb RCL tear. He underwent surgical repair with Arthrex anchor on 3/6/24. Henry Pham is referred to Occupational Therapy for evaluation and treatment. Patient presents today alone.    Falls: at onset of injury    Involved Side: Left  Dominant Side: Right    Date of Onset: 3/4/24 sx  Imaging: MRI  MRI resulted in in intra-articular  subarticular fracture of the proximal phalanx and 1st MCP joint and also acute full-thickness tear of RCL and acute high grade tear/sprain of the UCL of the thumb.   Previous Therapy: NA    Pain:  Functional Pain Scale Rating 0-10:   Current: 0/10  At Best: 0/10  At Worst: 4/10    Location: Left thumb  Description: tight, achy  Aggravating Factors: ROM flexion  Easing Factors: was in cast until today    Functional Limitations/Social History:  Prior Level of Function: Independent with all ADLs/IADLs    Current Level of Function:   ADLs: using primarily dominant RUE, increase time to complete  IADLs: Roommates and friends assisting with yard work, cooking  Leisure: plays cards, racquet ball, drumming, video games  Driving: Yes    Occupation:   ()  Working presently: employed - currently lighter duty since operation  Duties: programming, wiring    Patient's Goals for Therapy: to return to PLOF, to be able to shuffle cards, racquet ball    Past Medical History/Physical Systems Review:   Henry Pham  has a past medical history of Allergy, Asthma, not well controlled, Basal cell carcinoma, Constipation - functional, Cough, Hypothyroidism, Obesity (BMI 30-39.9), and Wheezing.    Henry Pham  has a past surgical history that includes Tonsillectomy and Finger tendon repair (Left, 3/4/2024).    Henry has a current medication list which includes the following prescription(s): acetaminophen, cholecalciferol (vitamin d3), ibuprofen, levothyroxine, and oxycodone-acetaminophen, and the following Facility-Administered Medications: fentanyl and midazolam.    Review of patient's allergies indicates:  No Known Allergies       Objective     Mental status: alert, oriented x3    Observation/Appearance:   Mild swelling about Left thumb, scar about radial thumb thick with fair mobility    Sensation: Patient denies numbness/tingling      Edema:  (Measured circumferential, in  centimeters)  Digits: Right  4/4/2024 Left  4/4/2024        Thumb:     Prox. Phalanx 6.8 7.0   IP 6.4 6.5            ELBOW, WRIST RANGE OF MOTION:   Measured in degrees of active motion with goniometer   Right  4/4/2024 Left  4/4/2024   Elbow Extension/Flexion WNL WNL   Pronation/Supination WNL WNL   Wrist Extension/Flexion 80/55 75/50   Ulnar/Radial Deviation 35/15 20/20       Digit AROM:  Measured in degrees of active motion with goniometer and/or distance measured from finger tip to the distal palmar crease (DPC)   Right  4/4/2024 Left  4/4/2024        Thumb: MP 0/32 0/18                IP 0/65 0/35       Rad ADD/ABD 45 40       Pal ADD/ABD 40 40       Opposition PIP crease SF Tip of SF       STRENGTH: Not tested due to precautions  (Measured in pounds using a Dynamometer and pinch meter)   Right  4/4/2024 Left  4/4/2024    Setting 2      Average     Key     3 Pt     Tip         Intake Outcome Measure for FOTO Initial Evaluation Survey    Therapist reviewed FOTO scores for Henry Pham on 4/4/2024.   FOTO documents entered into [employer] - see Media section.    Intake Score: 61%         Treatment     Treatment Time In: 10:45 am  Treatment Time Out: 11:20 am  Total Treatment time separate from Evaluation time: 35 min      Marin participated in dynamic functional therapeutic activities to improve functional performance for 15 minutes, including:  - Instructed in scar massage, 3 min, 3x/day  - AROM wrist flex/ext, UD/RD, thumb IP flexion, palmar abduction, circumduction, pinky slides (10 ea)  - In hand manipulation medium poms (1 set)  - Velcro wheel (20 reps) - provided with wheel for home use      Orthotic Fabrication,   - Fabricated hand based thumb spica with IP free, and MP in neutral, slight flexion  - Instructed to be worn at all times except HEP, hygiene, and light functional tasks  - Instructed in precautions of no gripping, pinching or weightbearing      Patient Education and Home  Exercises     Patient/Family Education Provided:   - Role of OT, goals for OT, scheduling/cancellations - pt verbalized understanding. Discussed insurance limitations with patient.    Written Home Exercises Provided: yes.  Exercises were reviewed and Marin was able to demonstrate them prior to the end of the session.  Marin demonstrated good  understanding of the education provided. See EMR under Patient Instructions for exercises provided during therapy sessions.     Pt was advised to perform these exercises free of pain, and to stop performing them if pain occurs.      Assessment     Henry Pham is a 23 y.o. male referred to outpatient occupational therapy and presents with a medical diagnosis of Z98.890 (ICD-10-CM) - Post-operative state S63.642D (ICD-10-CM) - Rupture of radial collateral ligament of left thumb, subsequent encounter. He presents today with edema, decreased ROM and precautions of no heavy /pinch. He will benefit from continued skilled OT services to progress with scar and edema management, ROM and strengthening as appropriate to facilitate increased functional use of LUE.    Following medical record review it is determined that pt will benefit from occupational therapy services in order to maximize pain free and/or functional use of left thumb. The following goals were discussed with the patient and patient is in agreement with them as to be addressed in the treatment plan. The patient's rehab potential is Good.     Anticipated barriers to occupational therapy: none    Plan of care discussed with patient: Yes    Patient's spiritual, cultural and educational needs considered and patient is agreeable to the plan of care and goals as stated below:     Medical Necessity is demonstrated by the following  Occupational Profile/History  Co-morbidities and personal factors that may impact the plan of care [x] LOW: Brief chart review  [] MODERATE: Expanded chart review   [] HIGH: Extensive  chart review    Moderate / High Support Documentation:      Examination  Performance deficits relating to physical, cognitive or psychosocial skills that result in activity limitations and/or participation restrictions  [] LOW: addressing 1-3 Performance deficits  [x] MODERATE: 3-5 Performance deficits  [] HIGH: 5+ Performance deficits (please support below)    Moderate / High Support Documentation:    Physical:  Joint Mobility  Skin Integrity/Scar Formation  Edema   Strength  Pinch Strength  Pain    Cognitive:  No Deficits    Psychosocial:    Habits  Routines  Rituals     Treatment Options [x] LOW: Limited options  [] MODERATE: Several options  [] HIGH: Multiple options      Decision Making/ Complexity Score: low         The following goals were discussed with the patient and patient is in agreement with them as to be addressed in the treatment plan.     Long Term Goals (to be met by discharge):  Date Goal Met:     1.) Henry Pham will demonstrate significantly improved functional performance from re-assessment as measured by a FOTO Intake score of more than 81.    Goal Status:   In progress    2.) Henry Pham will return to near to prior level of function for ADLs and household management reporting independence or modified independence.    Goal Status:   In progress    3. Henry Pham will report pain 2 out of 10 at worst to increase functional use of affected hand for work and leisure tasks.    Goal Status:   In progress     Short Term Goals (to be met by 5/2/24):  Date Goal Met:     Henry Pham will be independent with home exercise program with written instructions.    Goal Status:   In progress    Henry Pham will demonstrate touch to PIP crease of SF with thumb opposition to improve functional performance in ADLs/work/leisure tasks.    Goal Status:   In progress    Henry Pham will demonstrate within 20 lbs of  strength compared to unaffected  hand to improve functional grasp for ADLs/work/leisure tasks.    Goal Status:   In progress    Henry Pham will demonstrate the ability to complete ADL/IADL tasks with 4/10 pain.    Goal Status:   In progress    Henry Pham will be able to resume significantly greater occupational roles independently or modified as demonstrated by a FOTO Intake score of more than 71.    Goal Status:   In progress       Plan     Pt to be treated by Occupational Therapy 2 times per week for 12 weeks during the certification period from 4/4/2024 to 5/27/24 to achieve the established goals.     Treatment to include: Paraffin, Fluidotherapy, Manual therapy/joint mobilizations, Modalities for pain management, US 3 mhz, Therapeutic exercises/activities., Iontophoresis with 2.0 cc Dexamethasone, Strengthening, Orthotic Fabrication/Fit/Training, Edema Control, Scar Management, Electrical Modalities, Joint Protection, and Energy Conservation, as well as any other treatments deemed necessary based on the patient's needs or progress.       Bethanie Sheridan, OTR/L

## 2024-04-04 NOTE — PROGRESS NOTES
"4/4/24  Patient reports 4 weeks supposed left radial collateral ligament repair.  He has been in a fiberglass cast for the past 2 weeks.  States he is in minimal pain.  He states he is scheduled to begin therapy today.    3/18/24  Mr. Pham is here today for a post-operative visit.  He is 14 days status post Left Radial Collateral Ligament Repair of thumb by Dr. Smart on 3/4/24. He reports that he is minimal pain. Denies taking any pain medication.  He denies fever, chills, and sweats since the time of the surgery.     Physical exam:    Vitals:    04/04/24 0825   Weight: 112.5 kg (248 lb 0.3 oz)   Height: 5' 11" (1.803 m)   PainSc: 0-No pain     Vital signs are stable, patient is afebrile.  Patient is well dressed and well groomed, no acute distress.  Alert and oriented to person, place, and time.  Post op dressing taken down.  Incision is clean, dry and intact.  There is no erythema or exudate.  There is no sign of any infection. He is NVI. Sutures removed without difficulty. Able to make a composite fist. Opposition to small finger    Assessment:  s/p Left Radial Collateral Ligament Repair of thumb       Plan:  Henry was seen today for post-op evaluation.    Diagnoses and all orders for this visit:    Post-operative state    Complete tear of radial collateral ligament of interphalangeal joint of thumb        - PO instruction reviewed and provided to patient  - Educated patient on HEP ROM of the Elbow, Wrist and Hand for 15 minutes.  - Transition patient to a prefabricated thumb brace  - Patient will f/u in 6 weeks  - therapy begins today.  We will do standard protocol for RCL UCL of the MCP joint.    Landry Joshi PA-C, ATC  [facility]  [facility] [place], [place]    Disclaimer: This note has been generated using voice-recognition software. There may be typographical errors that have been missed during proof-reading.   "

## 2024-04-04 NOTE — PATIENT INSTRUCTIONS
"  [facility], OCCUPATIONAL THERAPY  HOME EXERCISE PROGRAM      Splint: wear the splint at all times except hygiene, HEP and light activity  Precautions:  NO heavy gripping or pinching yet, and no weightbearing    Complete scar massage, 3-5 minutes, 3 times a day:                  Complete the following exercises for 10 repetitions, 5x/day:       AROM: Wrist Flexion / Extension               Bend your wrist forward and back as far as possible.  (Perform 10 reps with making a gentle fist)        AROM: Wrist Radial / Ulnar Deviation  Position hand flat on the table.  Bend your wrist from side to side as far as possible.  (Sliding side to side like [employer] wipers)          AROM: Thumb IP Flexion / Extension      Brace thumb below tip joint. Bend joint as far as      possible then straighten.                  AROM: Palmar Adduction / Abduction   Rest your small finger on the table. Move thumb   sideways, out and away from   palm. Move back to rest along palm.                            AROM: Composite Movement Circumduction  Make clockwise circles with thumb. Reverse and make counterclockwise   circles with thumb.                                      AROM: Composite Flesion ("Pinky Slides")  Touch thumb to tip of small finger. Slide thumb down   small finger into palm.       Therapist: MIGUEL Mendez/L     Copyright © [facility]. All rights reserved.       "

## 2024-04-09 ENCOUNTER — CLINICAL SUPPORT (OUTPATIENT)
Dept: REHABILITATION | Facility: HOSPITAL | Age: 24
End: 2024-04-09
Payer: COMMERCIAL

## 2024-04-09 DIAGNOSIS — R29.898 DECREASED GRIP STRENGTH OF LEFT HAND: ICD-10-CM

## 2024-04-09 DIAGNOSIS — M25.642 DECREASED RANGE OF MOTION OF LEFT THUMB: ICD-10-CM

## 2024-04-09 DIAGNOSIS — M79.645 THUMB PAIN, LEFT: Primary | ICD-10-CM

## 2024-04-09 PROCEDURE — 97140 MANUAL THERAPY 1/> REGIONS: CPT | Mod: CO

## 2024-04-09 PROCEDURE — 97022 WHIRLPOOL THERAPY: CPT | Mod: CO

## 2024-04-09 PROCEDURE — 97530 THERAPEUTIC ACTIVITIES: CPT | Mod: CO

## 2024-04-09 PROCEDURE — 97110 THERAPEUTIC EXERCISES: CPT | Mod: CO

## 2024-04-09 NOTE — PROGRESS NOTES
[facility]  Occupational Therapy Treatment Note     Date: 4/9/2024  [facility]  Clinic Number: [id number]    Therapy Diagnosis:   Encounter Diagnoses   Name Primary?    Thumb pain, left Yes    Decreased  strength of left hand     Decreased range of motion of left thumb      Physician: Landry Joshi PA-C    Medical Diagnosis: Z98.890 (ICD-10-CM) - Post-operative state S63.642D (ICD-10-CM) - Rupture of radial collateral ligament of left thumb, subsequent encounter     Referring Physician: Landry Joshi PA-C  Physician Orders: Eval and Treat  Note:   L RCL Repair  DOS: 3/4/24  12 Visits  [facility]  Date of Return to MD: 5/27/24     Date of Injury: 2/11/24  Date of Surgery: 3/4/24  Left thumb RCL repair with Arthrex anchor     Evaluation Date: 4/4/2024  Authorization Period: 3/18/24 - 3/18/25  Plan of Care Expiration: 5/27/24  Visit #/ Visits Authorized: 1 of 20  FOTO Completion: Initial eval (4/4/2024)  FOTO #2:  FOTO #3:     Time In: 8:34 am  Time Out: 9:25 am  Total Appointment Time (timed & untimed codes): 51 min     Precautions: Standard, strengthening/weightbearing      Subjective     Patient reports: no pain  He was compliant with home exercise program given last session.   Response to previous treatment:first f/u  Functional change: none yet     Pain: 0/10  Location: left thumb      Objective     Objective Measures updated at progress report unless specified.  Mental status: alert, oriented x3     Observation/Appearance:   Mild swelling about Left thumb, scar about radial thumb thick with fair mobility     Sensation: Patient denies numbness/tingling        Edema:  (Measured circumferential, in centimeters)  Digits: Right  4/4/2024 Left  4/4/2024           Thumb:       Prox. Phalanx 6.8 7.0   IP 6.4 6.5                 ELBOW, WRIST RANGE OF MOTION:   Measured in degrees of active motion with goniometer    Right  4/4/2024 Left  4/4/2024   Elbow Extension/Flexion  WNL WNL   Pronation/Supination WNL WNL   Wrist Extension/Flexion 80/55 75/50   Ulnar/Radial Deviation 35/15 20/20         Digit AROM:  Measured in degrees of active motion with goniometer and/or distance measured from finger tip to the distal palmar crease (DPC)    Right  4/4/2024 Left  4/4/2024           Thumb: MP 0/32 0/18                IP 0/65 0/35       Rad ADD/ABD 45 40       Pal ADD/ABD 40 40       Opposition PIP crease SF Tip of SF         STRENGTH: Not tested due to precautions  (Measured in pounds using a Dynamometer and pinch meter)    Right  4/4/2024 Left  4/4/2024    Setting 2        Average       Key       3 Pt       Tip             Intake Outcome Measure for FOTO Initial Evaluation Survey     Therapist reviewed FOTO scores for Henry Pham on 4/4/2024.   FOTO documents entered into [employer] - see Media section.     Intake Score: 61%           Treatment     Marin received the treatments listed below:      therapeutic exercises to develop ROM for 16 minutes including:  Thumb IP blocks, rad and palm abd/add, opposition   Wrist flexion/ext, RD/UD     manual therapy techniques: Manual Lymphatic Drainage and Soft tissue Mobilization were applied to the: L thumb for 10 minutes, including:  MLD  Scar massage         therapeutic activities to improve functional performance for 15  minutes, including:  Tennis ball alphabets   Isospheres x 3 min   In hand manip with med poms x 3 sets     supervised modalities after being cleared for contradictions: Fluido x 10 min             Patient Education and Home Exercises     Education provided:   - cont HEP  - Progress towards goals     Written Home Exercises Provided: Patient instructed to cont prior HEP.  Exercises were reviewed and Marin was able to demonstrate them prior to the end of the session.  Marin demonstrated good  understanding of the home exercise program provided. See electronic medical record under Patient Instructions for exercises  provided during therapy sessions.       Assessment     Pt tolerated session well. Cont with thumb stiffness.    Client Care conference completed with evaluating therapist in regards to this patients POC as evidenced by co signature of supervising therapist.     Marin is progressing well towards his goals and there are no updates to goals at this time. Pt prognosis is Good.     Patient will continue to benefit from skilled outpatient occupational therapy to address the deficits listed in the problem list on initial evaluation provide patient/family education and to maximize patient's level of independence in the home and community environment.     Patient's spiritual, cultural and educational needs considered and patient agreeable to plan of care and goals.    Anticipated barriers to occupational therapy: none     Goals:  Long Term Goals (to be met by discharge):  Date Goal Met:       1.) Henry Pham will demonstrate significantly improved functional performance from re-assessment as measured by a FOTO Intake score of more than 81.     Goal Status:   In progress     2.) Henry Pham will return to near to prior level of function for ADLs and household management reporting independence or modified independence.     Goal Status:   In progress     3. Henry Pham will report pain 2 out of 10 at worst to increase functional use of affected hand for work and leisure tasks.     Goal Status:   In progress      Short Term Goals (to be met by 5/2/24):  Date Goal Met:       Henry Pham will be independent with home exercise program with written instructions.     Goal Status:   In progress     Henry Pham will demonstrate touch to PIP crease of SF with thumb opposition to improve functional performance in ADLs/work/leisure tasks.     Goal Status:   In progress     Henry Pham will demonstrate within 20 lbs of  strength compared to unaffected hand to improve  functional grasp for ADLs/work/leisure tasks.     Goal Status:   In progress     Henry Pham will demonstrate the ability to complete ADL/IADL tasks with 4/10 pain.     Goal Status:   In progress     Henry Pham will be able to resume significantly greater occupational roles independently or modified as demonstrated by a FOTO Intake score of more than 71.     Goal Status:   In progress        Plan     Updates/Grading for next session: cont with protocol as tolerated     STEPHANIE De Dios   4/9/2024

## 2024-04-10 NOTE — PROGRESS NOTES
[facility]  Occupational Therapy Treatment Note     Date: 4/12/2024  [facility]  [facility] Number: [id number]    Therapy Diagnosis:   Encounter Diagnoses   Name Primary?    Thumb pain, left Yes    Decreased  strength of left hand     Decreased range of motion of left thumb        Physician: Landry Joshi PA-C    Medical Diagnosis: Z98.890 (ICD-10-CM) - Post-operative state S63.642D (ICD-10-CM) - Rupture of radial collateral ligament of left thumb, subsequent encounter     Referring Physician: Landry Joshi PA-C  Physician Orders: Eval and Treat  Note:   L RCL Repair  DOS: 3/4/24  12 Visits  [facility]  Date of Return to MD: 5/27/24     Date of Injury: 2/11/24  Date of Surgery: 3/4/24  Left thumb RCL repair with Arthrex anchor     Evaluation Date: 4/4/2024  Authorization Period: 3/18/24 - 3/18/25  Plan of Care Expiration: 5/27/24  Visit #/ Visits Authorized: 2 of 20  FOTO Completion: Initial eval (4/4/2024)  FOTO #2:  FOTO #3:     Time In: 8:36 am  Time Out: 9:18 am  Total Appointment Time (timed & untimed codes): 42 min     Precautions: Standard, strengthening/weightbearing      Subjective     Patient reports: doing well, no pain   He was compliant with home exercise program given last session.   Response to previous treatment: good   Functional change: none yet     Pain: 0/10  Location: left thumb      Objective     Objective Measures updated at progress report unless specified.  Mental status: alert, oriented x3     Observation/Appearance:   Mild swelling about Left thumb, scar about radial thumb thick with fair mobility     Sensation: Patient denies numbness/tingling        Edema:  (Measured circumferential, in centimeters)  Digits: Right  4/4/2024 Left  4/4/2024           Thumb:       Prox. Phalanx 6.8 7.0   IP 6.4 6.5                 ELBOW, WRIST RANGE OF MOTION:   Measured in degrees of active motion with goniometer    Right  4/4/2024 Left  4/4/2024   Elbow  Extension/Flexion WNL WNL   Pronation/Supination WNL WNL   Wrist Extension/Flexion 80/55 75/50   Ulnar/Radial Deviation 35/15 20/20         Digit AROM:  Measured in degrees of active motion with goniometer and/or distance measured from finger tip to the distal palmar crease (DPC)    Right  4/4/2024 Left  4/4/2024           Thumb: MP 0/32 0/18                IP 0/65 0/35       Rad ADD/ABD 45 40       Pal ADD/ABD 40 40       Opposition PIP crease SF Tip of SF         STRENGTH: Not tested due to precautions  (Measured in pounds using a Dynamometer and pinch meter)    Right  4/4/2024 Left  4/4/2024    Setting 2        Average       Key       3 Pt       Tip             Intake Outcome Measure for FOTO Initial Evaluation Survey     Therapist reviewed FOTO scores for Henry Pham on 4/4/2024.   FOTO documents entered into [employer] - see Media section.     Intake Score: 61%           Treatment     Marin received the treatments listed below:      therapeutic exercises to develop ROM for 12 minutes including:  Thumb IP blocks, rad and palm abd/add, opposition   Wrist flexion/ext, RD/UD     manual therapy techniques: Manual Lymphatic Drainage and Soft tissue Mobilization were applied to the: L thumb for 10 minutes, including:  MLD  Scar massage         therapeutic activities to improve functional performance for 10  minutes, including:  Tennis ball alphabets   Isospheres x 3 min   In hand manip with med poms x 3 sets   Octy x 3 min   Thumb spool on RF c/cc 1 min     supervised modalities after being cleared for contradictions: Fluido x 10 min       Patient Education and Home Exercises     Education provided:   - cont HEP  - Progress towards goals     Written Home Exercises Provided: Patient instructed to cont prior HEP.  Exercises were reviewed and Marin was able to demonstrate them prior to the end of the session.  Marin demonstrated good  understanding of the home exercise program provided. See electronic  medical record under Patient Instructions for exercises provided during therapy sessions.       Assessment     Pt tolerated session well. Cont with thumb stiffness however is improving.   Client Care conference completed with evaluating therapist in regards to this patients POC as evidenced by co signature of supervising therapist.     Marin is progressing well towards his goals and there are no updates to goals at this time. Pt prognosis is Good.     Patient will continue to benefit from skilled outpatient occupational therapy to address the deficits listed in the problem list on initial evaluation provide patient/family education and to maximize patient's level of independence in the home and community environment.     Patient's spiritual, cultural and educational needs considered and patient agreeable to plan of care and goals.    Anticipated barriers to occupational therapy: none     Goals:  Long Term Goals (to be met by discharge):  Date Goal Met:       1.) Henry Pham will demonstrate significantly improved functional performance from re-assessment as measured by a FOTO Intake score of more than 81.     Goal Status:   In progress     2.) Henry Pham will return to near to prior level of function for ADLs and household management reporting independence or modified independence.     Goal Status:   In progress     3. Henry Pham will report pain 2 out of 10 at worst to increase functional use of affected hand for work and leisure tasks.     Goal Status:   In progress      Short Term Goals (to be met by 5/2/24):  Date Goal Met:       Henry Pham will be independent with home exercise program with written instructions.     Goal Status:   In progress     Henry Pham will demonstrate touch to PIP crease of SF with thumb opposition to improve functional performance in ADLs/work/leisure tasks.     Goal Status:   In progress     Henry Pham will demonstrate  within 20 lbs of  strength compared to unaffected hand to improve functional grasp for ADLs/work/leisure tasks.     Goal Status:   In progress     Henry Pham will demonstrate the ability to complete ADL/IADL tasks with 4/10 pain.     Goal Status:   In progress     Henry Pham will be able to resume significantly greater occupational roles independently or modified as demonstrated by a FOTO Intake score of more than 71.     Goal Status:   In progress        Plan     Updates/Grading for next session: cont with protocol as tolerated     STEPHANIE De Dios   4/12/2024

## 2024-04-12 ENCOUNTER — CLINICAL SUPPORT (OUTPATIENT)
Dept: REHABILITATION | Facility: HOSPITAL | Age: 24
End: 2024-04-12
Payer: COMMERCIAL

## 2024-04-12 DIAGNOSIS — M79.645 THUMB PAIN, LEFT: Primary | ICD-10-CM

## 2024-04-12 DIAGNOSIS — M25.642 DECREASED RANGE OF MOTION OF LEFT THUMB: ICD-10-CM

## 2024-04-12 DIAGNOSIS — R29.898 DECREASED GRIP STRENGTH OF LEFT HAND: ICD-10-CM

## 2024-04-12 PROCEDURE — 97140 MANUAL THERAPY 1/> REGIONS: CPT | Mod: CO

## 2024-04-12 PROCEDURE — 97110 THERAPEUTIC EXERCISES: CPT | Mod: CO

## 2024-04-12 PROCEDURE — 97022 WHIRLPOOL THERAPY: CPT | Mod: CO

## 2024-04-12 PROCEDURE — 97530 THERAPEUTIC ACTIVITIES: CPT | Mod: CO

## 2024-04-16 ENCOUNTER — CLINICAL SUPPORT (OUTPATIENT)
Dept: REHABILITATION | Facility: HOSPITAL | Age: 24
End: 2024-04-16
Payer: COMMERCIAL

## 2024-04-16 DIAGNOSIS — M79.645 THUMB PAIN, LEFT: Primary | ICD-10-CM

## 2024-04-16 DIAGNOSIS — M25.642 DECREASED RANGE OF MOTION OF LEFT THUMB: ICD-10-CM

## 2024-04-16 DIAGNOSIS — R29.898 DECREASED GRIP STRENGTH OF LEFT HAND: ICD-10-CM

## 2024-04-16 PROCEDURE — 97110 THERAPEUTIC EXERCISES: CPT | Mod: 59

## 2024-04-16 PROCEDURE — 97530 THERAPEUTIC ACTIVITIES: CPT

## 2024-04-16 PROCEDURE — 97763 ORTHC/PROSTC MGMT SBSQ ENC: CPT

## 2024-04-16 PROCEDURE — 97022 WHIRLPOOL THERAPY: CPT

## 2024-04-16 NOTE — PROGRESS NOTES
[facility]  Occupational Therapy Treatment Note     Date: 4/16/2024  [facility] Number: [id number]    Therapy Diagnosis:   Encounter Diagnoses   Name Primary?    Thumb pain, left Yes    Decreased  strength of left hand     Decreased range of motion of left thumb        Physician: Landry Joshi PA-C    Medical Diagnosis: Z98.890 (ICD-10-CM) - Post-operative state S63.642D (ICD-10-CM) - Rupture of radial collateral ligament of left thumb, subsequent encounter     Referring Physician: Landry Joshi PA-C  Physician Orders: Eval and Treat  Note:   L RCL Repair  DOS: 3/4/24  12 Visits  [facility]  Date of Return to MD: 5/27/24     Date of Injury: 2/11/24  Date of Surgery: 3/4/24  Left thumb RCL repair with Arthrex anchor     Evaluation Date: 4/4/2024  Authorization Period: 3/18/24 - 3/18/25  Plan of Care Expiration: 5/27/24  Visit #/ Visits Authorized: 4 of 20  FOTO Completion: Initial eval (4/4/2024)  FOTO #2:  FOTO #3:     Time In: 8:35 am  Time Out: 9:30 am  Total Appointment Time (timed & untimed codes): 55 min     Precautions: Standard, strengthening/weightbearing      Subjective     Patient reports: doing well, no pain - he left his splint in the car and it melted some he hasn't been able to wear it the past couple days  He was compliant with home exercise program given last session.   Response to previous treatment: good   Functional change: none yet     Pain: 0/10  Location: left thumb      Objective     Objective Measures updated at progress report unless specified.  Mental status: alert, oriented x3     Observation/Appearance:   Mild swelling about Left thumb, scar about radial thumb thick with fair mobility     Sensation: Patient denies numbness/tingling        Edema:  (Measured circumferential, in centimeters)  Digits: Right  4/4/2024 Left  4/4/2024           Thumb:       Prox. Phalanx 6.8 7.0   IP 6.4 6.5                 ELBOW, WRIST RANGE OF MOTION:    Measured in degrees of active motion with goniometer    Right  4/4/2024 Left  4/4/2024   Elbow Extension/Flexion WNL WNL   Pronation/Supination WNL WNL   Wrist Extension/Flexion 80/55 75/50   Ulnar/Radial Deviation 35/15 20/20         Digit AROM:  Measured in degrees of active motion with goniometer and/or distance measured from finger tip to the distal palmar crease (DPC)    Right  4/4/2024 Left  4/4/2024           Thumb: MP 0/32 0/18                IP 0/65 0/35       Rad ADD/ABD 45 40       Pal ADD/ABD 40 40       Opposition PIP crease SF Tip of SF         STRENGTH: Not tested due to precautions  (Measured in pounds using a Dynamometer and pinch meter)    Right  4/4/2024 Left  4/4/2024    Setting 2        Average       Key       3 Pt       Tip             Intake Outcome Measure for FOTO Initial Evaluation Survey     Therapist reviewed FOTO scores for Henry Pham on 4/4/2024.   FOTO documents entered into [employer] - see Media section.     Intake Score: 61%           Treatment     Marin received the treatments listed below:      Supervised modalities after being cleared for contraindications: Fluidotherapy - 115 degrees and 50 speed, to Left hand for 10 minutes for increased blood flow and circulation, increased tissue extensibility, and pain management.      therapeutic exercises to develop ROM for 15 minutes including:  Thumb IP blocks, rad and palm abd/add, opposition   Wrist flexion/ext, RD/UD   Spitting suture removal about scar, and application of dressing with bacitracin and bandaid - instructed to remove later today and change bandaid, monitor for signs of infection       manual therapy techniques: Manual Lymphatic Drainage and Soft tissue Mobilization were applied to the: L thumb for 0 minutes, including:  Scar massage - NT today      therapeutic activities to improve functional performance for 20 minutes, including:  Tennis ball alphabets - NT  Isospheres x 3 min   In hand manip with  small poms x 3 sets   Octy x 3 min   Thumb spool on RF c/cc 1 min       Orthotic Modification x 10 min  - Adjusted orthosis due to patient leaving in the hot car and requiring re-molding, applied pad/liner to inside area where orthosis makes contact with scar      Patient Education and Home Exercises     Education provided:   - cont HEP  - Progress towards goals     Written Home Exercises Provided: Patient instructed to cont prior HEP.  Exercises were reviewed and Marin was able to demonstrate them prior to the end of the session.  Marin demonstrated good  understanding of the home exercise program provided. See electronic medical record under Patient Instructions for exercises provided during therapy sessions.       Assessment     Pt tolerated session well. Presents with three areas of redness about the scar, resembling spitting sutures. Able to remove suture from one area and noted serosanguinous drainage. No signs of infection but instructed to monitor and message MD regarding any concern for infection. Cont with thumb stiffness however is improving.     Marin is progressing well towards his goals and there are no updates to goals at this time. Pt prognosis is Good.     Patient will continue to benefit from skilled outpatient occupational therapy to address the deficits listed in the problem list on initial evaluation provide patient/family education and to maximize patient's level of independence in the home and community environment.     Patient's spiritual, cultural and educational needs considered and patient agreeable to plan of care and goals.    Anticipated barriers to occupational therapy: none     Goals:  Long Term Goals (to be met by discharge):  Date Goal Met:       1.) Henry Pham will demonstrate significantly improved functional performance from re-assessment as measured by a FOTO Intake score of more than 81.     Goal Status:   In progress     2.) Henry Pham will return  to near to prior level of function for ADLs and household management reporting independence or modified independence.     Goal Status:   In progress     3. Henry Pham will report pain 2 out of 10 at worst to increase functional use of affected hand for work and leisure tasks.     Goal Status:   In progress      Short Term Goals (to be met by 5/2/24):  Date Goal Met:       Herny Pham will be independent with home exercise program with written instructions.     Goal Status:   In progress     Henry Pham will demonstrate touch to PIP crease of SF with thumb opposition to improve functional performance in ADLs/work/leisure tasks.     Goal Status:   In progress     Henry Pham will demonstrate within 20 lbs of  strength compared to unaffected hand to improve functional grasp for ADLs/work/leisure tasks.     Goal Status:   In progress     Henry Pham will demonstrate the ability to complete ADL/IADL tasks with 4/10 pain.     Goal Status:   In progress     Henry Pham will be able to resume significantly greater occupational roles independently or modified as demonstrated by a FOTO Intake score of more than 71.     Goal Status:   In progress        Plan     Updates/Grading for next session: cont with protocol as tolerated     Bethanie Sheridan OT   4/16/2024

## 2024-04-18 NOTE — PROGRESS NOTES
[facility]  Occupational Therapy Treatment Note     Date: 4/19/2024  [facility]  [facility] Number: [id number]    Therapy Diagnosis:   Encounter Diagnoses   Name Primary?    Thumb pain, left Yes    Decreased  strength of left hand     Decreased range of motion of left thumb          Physician: Landry Joshi PA-C    Medical Diagnosis: Z98.890 (ICD-10-CM) - Post-operative state S63.642D (ICD-10-CM) - Rupture of radial collateral ligament of left thumb, subsequent encounter     Referring Physician: Landry Joshi PA-C  Physician Orders: Eval and Treat  Note:   L RCL Repair  DOS: 3/4/24  12 Visits  [facility]  Date of Return to MD: 5/27/24     Date of Injury: 2/11/24  Date of Surgery: 3/4/24  Left thumb RCL repair with Arthrex anchor     Evaluation Date: 4/4/2024  Authorization Period: 3/18/24 - 3/18/25  Plan of Care Expiration: 5/27/24  Visit #/ Visits Authorized: 5 of 20  FOTO Completion: Initial eval (4/4/2024)  FOTO #2:  FOTO #3:     Time In: 8:35 am  Time Out: 9:25 am  Total Appointment Time (timed & untimed codes): 50 min     Precautions: Standard, strengthening/weightbearing      Subjective     Patient reports: improved areas of concern on incision site, and improved fit of orthosis.   He was compliant with home exercise program given last session.   Response to previous treatment: good   Functional change: none yet     Pain: 0/10  Location: left thumb      Objective     Objective Measures updated at progress report unless specified.  Mental status: alert, oriented x3     Observation/Appearance:   Mild swelling about Left thumb, scar about radial thumb thick with fair mobility     Sensation: Patient denies numbness/tingling        Edema:  (Measured circumferential, in centimeters)  Digits: Right  4/4/2024 Left  4/4/2024           Thumb:       Prox. Phalanx 6.8 7.0   IP 6.4 6.5                 ELBOW, WRIST RANGE OF MOTION:   Measured in degrees of active motion with  goniometer    Right  4/4/2024 Left  4/4/2024   Elbow Extension/Flexion WNL WNL   Pronation/Supination WNL WNL   Wrist Extension/Flexion 80/55 75/50   Ulnar/Radial Deviation 35/15 20/20         Digit AROM:  Measured in degrees of active motion with goniometer and/or distance measured from finger tip to the distal palmar crease (DPC)    Right  4/4/2024 Left  4/4/2024           Thumb: MP 0/32 0/18                IP 0/65 0/35       Rad ADD/ABD 45 40       Pal ADD/ABD 40 40       Opposition PIP crease SF Tip of SF         STRENGTH: Not tested due to precautions  (Measured in pounds using a Dynamometer and pinch meter)    Right  4/4/2024 Left  4/4/2024    Setting 2        Average       Key       3 Pt       Tip             Intake Outcome Measure for FOTO Initial Evaluation Survey     Therapist reviewed FOTO scores for Henry Pham on 4/4/2024.   FOTO documents entered into [employer] - see Media section.     Intake Score: 61%           Treatment     Marin received the treatments listed below:      Supervised modalities after being cleared for contraindications: Fluidotherapy - 115 degrees and 50 speed, to Left hand for 10 minutes for increased blood flow and circulation, increased tissue extensibility, and pain management.      therapeutic exercises to develop ROM for 8 minutes including:  Thumb IP blocks, rad and palm abd/add, opposition   Wrist flexion/ext, RD/UD       manual therapy techniques: Manual Lymphatic Drainage and Soft tissue Mobilization were applied to the: L thumb for 10 minutes, including:  Scar massage   Gentle passive ROM at IP and MP     therapeutic activities to improve functional performance for 22 minutes, including:  Tennis ball alphabets x 2 sets  Isospheres x 3 min   In hand manip with small poms x 3 sets   Octy x 3 min   Thumb spool on RF c/cc 1 min   Thumb rings digits 2-4 x 2 min   Wrist maze x 3 min     Orthotic Modification x 0 min (NT)  - Adjusted orthosis due to patient leaving  in the hot car and requiring re-molding, applied pad/liner to inside area where orthosis makes contact with scar      Patient Education and Home Exercises     Education provided:   - cont HEP  - Progress towards goals     Written Home Exercises Provided: Patient instructed to cont prior HEP.  Exercises were reviewed and Marin was able to demonstrate them prior to the end of the session.  Marin demonstrated good  understanding of the home exercise program provided. See electronic medical record under Patient Instructions for exercises provided during therapy sessions.       Assessment     Pt tolerated session well. Areas of concern improved today with mild erythema and no drainage. Good tolerance to gentle PROM.   Cont with thumb stiffness however is improving.     Marin is progressing well towards his goals and there are no updates to goals at this time. Pt prognosis is Good.     Patient will continue to benefit from skilled outpatient occupational therapy to address the deficits listed in the problem list on initial evaluation provide patient/family education and to maximize patient's level of independence in the home and community environment.     Patient's spiritual, cultural and educational needs considered and patient agreeable to plan of care and goals.    Anticipated barriers to occupational therapy: none     Goals:  Long Term Goals (to be met by discharge):  Date Goal Met:       1.) Henry Pham will demonstrate significantly improved functional performance from re-assessment as measured by a FOTO Intake score of more than 81.     Goal Status:   In progress     2.) Henry Pham will return to near to prior level of function for ADLs and household management reporting independence or modified independence.     Goal Status:   In progress     3. Henry Pham will report pain 2 out of 10 at worst to increase functional use of affected hand for work and leisure tasks.     Goal  Status:   In progress      Short Term Goals (to be met by 5/2/24):  Date Goal Met:       Henry Pham will be independent with home exercise program with written instructions.     Goal Status:   In progress     Henry Pham will demonstrate touch to PIP crease of SF with thumb opposition to improve functional performance in ADLs/work/leisure tasks.     Goal Status:   In progress     Henry Pham will demonstrate within 20 lbs of  strength compared to unaffected hand to improve functional grasp for ADLs/work/leisure tasks.     Goal Status:   In progress     Henry Pham will demonstrate the ability to complete ADL/IADL tasks with 4/10 pain.     Goal Status:   In progress     Henry Pham will be able to resume significantly greater occupational roles independently or modified as demonstrated by a FOTO Intake score of more than 71.     Goal Status:   In progress        Plan     Updates/Grading for next session: cont with protocol as tolerated     STEPHANIE De Dios   4/19/2024

## 2024-04-19 ENCOUNTER — CLINICAL SUPPORT (OUTPATIENT)
Dept: REHABILITATION | Facility: HOSPITAL | Age: 24
End: 2024-04-19
Payer: COMMERCIAL

## 2024-04-19 DIAGNOSIS — M79.645 THUMB PAIN, LEFT: Primary | ICD-10-CM

## 2024-04-19 DIAGNOSIS — R29.898 DECREASED GRIP STRENGTH OF LEFT HAND: ICD-10-CM

## 2024-04-19 DIAGNOSIS — M25.642 DECREASED RANGE OF MOTION OF LEFT THUMB: ICD-10-CM

## 2024-04-19 PROCEDURE — 97140 MANUAL THERAPY 1/> REGIONS: CPT | Mod: CO

## 2024-04-19 PROCEDURE — 97110 THERAPEUTIC EXERCISES: CPT | Mod: CO

## 2024-04-19 PROCEDURE — 97022 WHIRLPOOL THERAPY: CPT | Mod: CO

## 2024-04-19 PROCEDURE — 97530 THERAPEUTIC ACTIVITIES: CPT | Mod: CO

## 2024-04-23 ENCOUNTER — CLINICAL SUPPORT (OUTPATIENT)
Dept: REHABILITATION | Facility: HOSPITAL | Age: 24
End: 2024-04-23
Payer: COMMERCIAL

## 2024-04-23 DIAGNOSIS — M79.645 THUMB PAIN, LEFT: Primary | ICD-10-CM

## 2024-04-23 DIAGNOSIS — R29.898 DECREASED GRIP STRENGTH OF LEFT HAND: ICD-10-CM

## 2024-04-23 DIAGNOSIS — M25.642 DECREASED RANGE OF MOTION OF LEFT THUMB: ICD-10-CM

## 2024-04-23 PROCEDURE — 97022 WHIRLPOOL THERAPY: CPT

## 2024-04-23 PROCEDURE — 97140 MANUAL THERAPY 1/> REGIONS: CPT

## 2024-04-23 PROCEDURE — 97530 THERAPEUTIC ACTIVITIES: CPT

## 2024-04-23 PROCEDURE — 97110 THERAPEUTIC EXERCISES: CPT

## 2024-04-26 ENCOUNTER — CLINICAL SUPPORT (OUTPATIENT)
Dept: REHABILITATION | Facility: HOSPITAL | Age: 24
End: 2024-04-26
Payer: COMMERCIAL

## 2024-04-26 DIAGNOSIS — M25.642 DECREASED RANGE OF MOTION OF LEFT THUMB: ICD-10-CM

## 2024-04-26 DIAGNOSIS — R29.898 DECREASED GRIP STRENGTH OF LEFT HAND: ICD-10-CM

## 2024-04-26 DIAGNOSIS — M79.645 THUMB PAIN, LEFT: Primary | ICD-10-CM

## 2024-04-26 PROCEDURE — 97110 THERAPEUTIC EXERCISES: CPT | Mod: CO

## 2024-04-26 PROCEDURE — 97140 MANUAL THERAPY 1/> REGIONS: CPT | Mod: CO

## 2024-04-26 PROCEDURE — 97530 THERAPEUTIC ACTIVITIES: CPT | Mod: CO

## 2024-04-26 PROCEDURE — 97022 WHIRLPOOL THERAPY: CPT | Mod: CO

## 2024-04-26 NOTE — PROGRESS NOTES
[facility]  Occupational Therapy Treatment Note     Date: 4/26/2024  [facility]  [facility] Number: [id number]    Therapy Diagnosis:   Encounter Diagnoses   Name Primary?    Thumb pain, left Yes    Decreased  strength of left hand     Decreased range of motion of left thumb          Physician: Landry Joshi PA-C    Medical Diagnosis: Z98.890 (ICD-10-CM) - Post-operative state S63.642D (ICD-10-CM) - Rupture of radial collateral ligament of left thumb, subsequent encounter     Referring Physician: Landry Joshi PA-C  Physician Orders: Eval and Treat  Note:   L RCL Repair  DOS: 3/4/24  12 Visits  [facility]  Date of Return to MD: 5/27/24     Date of Injury: 2/11/24  Date of Surgery: 3/4/24  Left thumb RCL repair with Arthrex anchor     Evaluation Date: 4/4/2024  Authorization Period: 3/18/24 - 3/18/25  Plan of Care Expiration: 5/27/24  Visit #/ Visits Authorized: 6 of 20  FOTO Completion: Initial eval (4/4/2024)  FOTO #2:  FOTO #3:     Time In: 8:34 am  Time Out: 9:28 am  Total Appointment Time (timed & untimed codes): 54 min     Precautions: Standard, strengthening/weightbearing      Subjective     Patient reports: doing well, will be out of town for work next week   He was compliant with home exercise program given last session.   Response to previous treatment: good   Functional change: none yet     Pain: 0/10  Location: left thumb      Objective     Objective Measures updated at progress report unless specified.  Mental status: alert, oriented x3     Observation/Appearance:   Mild swelling about Left thumb, scar about radial thumb thick with fair mobility     Sensation: Patient denies numbness/tingling        Edema:  (Measured circumferential, in centimeters)  Digits: Right  4/4/2024 Left  4/4/2024           Thumb:       Prox. Phalanx 6.8 7.0   IP 6.4 6.5                 ELBOW, WRIST RANGE OF MOTION:   Measured in degrees of active motion with goniometer     Right  4/4/2024 Left  4/4/2024   Elbow Extension/Flexion WNL WNL   Pronation/Supination WNL WNL   Wrist Extension/Flexion 80/55 75/50   Ulnar/Radial Deviation 35/15 20/20         Digit AROM:  Measured in degrees of active motion with goniometer and/or distance measured from finger tip to the distal palmar crease (DPC)    Right  4/4/2024 Left  4/4/2024           Thumb: MP 0/32 0/18                IP 0/65 0/35       Rad ADD/ABD 45 40       Pal ADD/ABD 40 40       Opposition PIP crease SF Tip of SF         STRENGTH: Not tested due to precautions  (Measured in pounds using a Dynamometer and pinch meter)    Right   Left      Setting 2        Average       Key       3 Pt       Tip             Intake Outcome Measure for FOTO Initial Evaluation Survey     Therapist reviewed FOTO scores for Henry Pham on 4/4/2024.   FOTO documents entered into [employer] - see Media section.     Intake Score: 61%           Treatment     Marin received the treatments listed below:      Supervised modalities after being cleared for contraindications: Fluidotherapy - 115 degrees and 50 speed, to Left hand for 10 minutes for increased blood flow and circulation, increased tissue extensibility, and pain management.      therapeutic exercises to develop ROM for 15 minutes including:  Thumb IP blocks, rad and palm abd/add, opposition   Wrist flexion/ext, RD/UD   Isometric tennis ball  (20 reps)  Isometric opposition  (20 reps)  Wrist curls, 2# (3 x 10)  Blue sponge issued for HEP     manual therapy techniques: Manual Lymphatic Drainage and Soft tissue Mobilization were applied to the: L thumb for 10 minutes, including:  Scar massage   Gentle passive ROM at IP and MP       therapeutic activities to improve functional performance for 19 minutes, including:  Tennis ball alphabets x 2 sets  Isospheres x 3 min   In hand manip with coins x 3 sets   Octy x 3 min - NT  Thumb spool on RF c/cc 1 min - NT  Thumb rings digits 2-5 x 3  min   Opposition  foam pieces        Patient Education and Home Exercises     Education provided:   - cont HEP  - Progress towards goals     Written Home Exercises Provided: Patient instructed to cont prior HEP.  Exercises were reviewed and Marin was able to demonstrate them prior to the end of the session.  Marin demonstrated good  understanding of the home exercise program provided. See electronic medical record under Patient Instructions for exercises provided during therapy sessions.       Assessment     Pt tolerated session well with no pain.  Will reassess pinch strength and  upon return from work trip and progress as tolerated.    Marin is progressing well towards his goals and there are no updates to goals at this time. Pt prognosis is Good.     Patient will continue to benefit from skilled outpatient occupational therapy to address the deficits listed in the problem list on initial evaluation provide patient/family education and to maximize patient's level of independence in the home and community environment.     Patient's spiritual, cultural and educational needs considered and patient agreeable to plan of care and goals.    Anticipated barriers to occupational therapy: none     Goals:  Long Term Goals (to be met by discharge):  Date Goal Met:       1.) Henry Pham will demonstrate significantly improved functional performance from re-assessment as measured by a FOTO Intake score of more than 81.     Goal Status:   In progress     2.) Henry Pham will return to near to prior level of function for ADLs and household management reporting independence or modified independence.     Goal Status:   In progress     3. Henry Pham will report pain 2 out of 10 at worst to increase functional use of affected hand for work and leisure tasks.     Goal Status:   In progress      Short Term Goals (to be met by 5/2/24):  Date Goal Met:       Henry Pham will be  independent with home exercise program with written instructions.     Goal Status:   In progress     Henry Pham will demonstrate touch to PIP crease of SF with thumb opposition to improve functional performance in ADLs/work/leisure tasks.     Goal Status:   In progress     Henry Pham will demonstrate within 20 lbs of  strength compared to unaffected hand to improve functional grasp for ADLs/work/leisure tasks.     Goal Status:   In progress     Henry Pham will demonstrate the ability to complete ADL/IADL tasks with 4/10 pain.     Goal Status:   In progress     Henry Pham will be able to resume significantly greater occupational roles independently or modified as demonstrated by a FOTO Intake score of more than 71.     Goal Status:   In progress        Plan     Updates/Grading for next session: cont with protocol as tolerated     STEPHANIE De Dios   4/26/2024

## 2024-05-07 ENCOUNTER — CLINICAL SUPPORT (OUTPATIENT)
Dept: REHABILITATION | Facility: HOSPITAL | Age: 24
End: 2024-05-07
Payer: COMMERCIAL

## 2024-05-07 DIAGNOSIS — E03.9 HYPOTHYROIDISM, UNSPECIFIED TYPE: ICD-10-CM

## 2024-05-07 DIAGNOSIS — R29.898 DECREASED GRIP STRENGTH OF LEFT HAND: ICD-10-CM

## 2024-05-07 DIAGNOSIS — M25.642 DECREASED RANGE OF MOTION OF LEFT THUMB: ICD-10-CM

## 2024-05-07 DIAGNOSIS — M79.645 THUMB PAIN, LEFT: Primary | ICD-10-CM

## 2024-05-07 PROCEDURE — 97530 THERAPEUTIC ACTIVITIES: CPT

## 2024-05-07 PROCEDURE — 97022 WHIRLPOOL THERAPY: CPT

## 2024-05-07 PROCEDURE — 97110 THERAPEUTIC EXERCISES: CPT

## 2024-05-07 RX ORDER — LEVOTHYROXINE SODIUM 112 UG/1
112 TABLET ORAL
Qty: 90 TABLET | Refills: 3 | Status: SHIPPED | OUTPATIENT
Start: 2024-05-07

## 2024-05-07 NOTE — PROGRESS NOTES
[facility]  Occupational Therapy Treatment Note     Date: 5/7/2024  Name: Henry Pham  Clinic Number: [id number]    Therapy Diagnosis:   Encounter Diagnoses   Name Primary?    Thumb pain, left Yes    Decreased  strength of left hand     Decreased range of motion of left thumb        Physician: Landry Joshi PA-C    Medical Diagnosis: Z98.890 (ICD-10-CM) - Post-operative state S63.642D (ICD-10-CM) - Rupture of radial collateral ligament of left thumb, subsequent encounter     Referring Physician: Landry Joshi PA-C  Physician Orders: Eval and Treat  Note:   L RCL Repair  DOS: 3/4/24  12 Visits  [facility]  Date of Return to MD: 5/27/24     Date of Injury: 2/11/24  Date of Surgery: 3/4/24  Left thumb RCL repair with Arthrex anchor     Evaluation Date: 4/4/2024  Authorization Period: 3/18/24 - 3/18/25  Plan of Care Expiration: 5/27/24  Visit #/ Visits Authorized: 8 of 20  FOTO Completion: Initial eval (4/4/2024)  FOTO #2: 5/7/24  FOTO #3:     Last Name: PARRIS   Access Code: [email or web address]     Time In: 8:40 am  Time Out: 9:35 am  Total Appointment Time (timed & untimed codes): 55 min     Precautions: Standard      Subjective     Patient reports: doing well, difficulty with heavy , was unable to perform a specific work task last week requiring tight  to loosen a screw  He was compliant with home exercise program given last session.   Response to previous treatment:   Functional change: increase functional use for light activity     Pain: 0/10  Location: left thumb      Objective     Objective Measures updated at progress report unless specified.  Mental status: alert, oriented x3     Observation/Appearance:   Mild swelling about Left thumb, scar about radial thumb thick with fair mobility     Sensation: Patient denies numbness/tingling        Edema:  (Measured circumferential, in centimeters)  Digits: Right  4/4/2024 Left  4/4/2024           Thumb:       Prox. Phalanx 6.8 7.0    IP 6.4 6.5                 ELBOW, WRIST RANGE OF MOTION:   Measured in degrees of active motion with goniometer    Right  4/4/2024 Left  4/4/2024   Elbow Extension/Flexion WNL WNL   Pronation/Supination WNL WNL   Wrist Extension/Flexion 80/55 75/50   Ulnar/Radial Deviation 35/15 20/20         Digit AROM:  Measured in degrees of active motion with goniometer and/or distance measured from finger tip to the distal palmar crease (DPC)    Right  4/4/2024 Left  4/4/2024 Left  5/7/24            Thumb: MP 0/32 0/18 0/30                IP 0/65 0/35 0/65       Rad ADD/ABD 45 40        Pal ADD/ABD 40 40        Opposition PIP crease SF Tip of SF PIP crease  Of SF         STRENGTH: (Measured in pounds using a Dynamometer and pinch meter)    Right  5/7/24 Left  5/7/24    Setting 2  70, 60, 75  25, 25, 25*    Average  63 lb  25 lb   Key  24   12   3 Pt 15   13   Tip  14 10          Intake Outcome Measure for FOTO Initial Evaluation Survey     Therapist reviewed FOTO scores for Henry Pham on 4/4/2024.   FOTO documents entered into [employer] - see Media section.     Intake Score: 61%           Treatment     Marin received the treatments listed below:      Supervised modalities after being cleared for contraindications: Fluidotherapy - 115 degrees and 50 speed, to Left hand for 10 minutes for increased blood flow and circulation, increased tissue extensibility, and pain management.      therapeutic exercises to develop ROM for 20 minutes including:  - Assessment of /pinch strength  - Pink putty , rolls and pinches (20 ea) - added to HEP  - Wrist curls, 3 ways, 3# (3 x 10)      therapeutic activities to improve functional performance for 25 minutes, including:  - In hand manip with coins x 1 container  - Thumb presser one yellow rubberband (20 reps)  - Yellow pin foam , 3 pt pinch and key pinch (1 container each)  - Resistive gripper, 18#, repetitive foam  (1 container)      Patient Education  and Home Exercises     Education provided:   - cont HEP  - Progress towards goals     Written Home Exercises Provided: yes. Putty tasks  Exercises were reviewed and Marin was able to demonstrate them prior to the end of the session.  Marin demonstrated good  understanding of the home exercise program provided. See electronic medical record under Patient Instructions for exercises provided during therapy sessions.       Assessment     Pt tolerated session well with with mild reports of discomfort/pain about thumb during gripping tasks.  and pinch are limited compared to unaffected hand. Upgraded HEP with putty tasks for  and pinch strengthening. Will progress as able.    Marin is progressing well towards his goals and there are no updates to goals at this time. Pt prognosis is Good.     Patient will continue to benefit from skilled outpatient occupational therapy to address the deficits listed in the problem list on initial evaluation provide patient/family education and to maximize patient's level of independence in the home and community environment.     Patient's spiritual, cultural and educational needs considered and patient agreeable to plan of care and goals.    Anticipated barriers to occupational therapy: none     Goals:  Long Term Goals (to be met by discharge):  Date Goal Met:       1.) Henry Pahm will demonstrate significantly improved functional performance from re-assessment as measured by a FOTO Intake score of more than 81.     Goal Status:   In progress     2.) Henry Pham will return to near to prior level of function for ADLs and household management reporting independence or modified independence.     Goal Status:   In progress     3. Henry Pham will report pain 2 out of 10 at worst to increase functional use of affected hand for work and leisure tasks.     Goal Status:   In progress      Short Term Goals (to be met by 5/2/24):  Date Goal Met:        Henry Pham will be independent with home exercise program with written instructions.     Goal Status:   In progress     Henry Pham will demonstrate touch to PIP crease of SF with thumb opposition to improve functional performance in ADLs/work/leisure tasks.     Goal Status:   In progress     Henry Pham will demonstrate within 20 lbs of  strength compared to unaffected hand to improve functional grasp for ADLs/work/leisure tasks.     Goal Status:   In progress     Henry Pham will demonstrate the ability to complete ADL/IADL tasks with 4/10 pain.     Goal Status:   In progress     Henry Pham will be able to resume significantly greater occupational roles independently or modified as demonstrated by a FOTO Intake score of more than 71.     Goal Status:   In progress        Plan     Updates/Grading for next session: cont with protocol as tolerated     Bethanie Sheridan, OT   5/7/2024

## 2024-05-07 NOTE — PATIENT INSTRUCTIONS
"[facility]  HOME EXERCISE PROGRAM     Complete the following strengthening program 1x/day.        /Squeezes  - Squeeze putty, gripping for a max of 2 - 3 min            [place] Pinches  - Roll putty into a log  - Pinch along with the thumb, index and middle fingers.  - Make sure to keep an "O"        Richardson Pinches  - Roll putty into a log  - Pinch between the thumb and side of index finger, as if holding a richardson!      Bethanie Sheridan, OTR/L       "

## 2024-05-07 NOTE — TELEPHONE ENCOUNTER
Refill Routing Note   Medication(s) are not appropriate for processing by [facility] for the following reason(s):        No active prescription written by provider: not yet signed by current PCP    [facility] action(s):  Defer      Medication Therapy Plan:         Appointments  past 12m or future 3m with PCP    Date Provider   Last Visit   3/23/2023 Tao Boyer, DO   Next Visit   Visit date not found Tao Boyer, DO   ED visits in past 90 days: 1        Note composed:3:44 PM 05/07/2024

## 2024-05-09 NOTE — PROGRESS NOTES
[facility]  Occupational Therapy Treatment Note     Date: 5/10/2024  [facility]  [facility] Number[facility] [id number]    Therapy Diagnosis:   Encounter Diagnoses   Name Primary?    Thumb pain, left Yes    Decreased  strength of left hand     Decreased range of motion of left thumb          Physician: Landry Joshi PA-C    Medical Diagnosis: Z98.890 (ICD-10-CM) - Post-operative state S63.642D (ICD-10-CM) - Rupture of radial collateral ligament of left thumb, subsequent encounter     Referring Physician: Landry Joshi PA-C  Physician Orders: Eval and Treat  Note:   L RCL Repair  DOS: 3/4/24  12 Visits  [facility]  Date of Return to MD: 5/27/24     Date of Injury: 2/11/24  Date of Surgery: 3/4/24  Left thumb RCL repair with Arthrex anchor     Evaluation Date: 4/4/2024  Authorization Period: 3/18/24 - 3/18/25  Plan of Care Expiration: 5/27/24  Visit #/ Visits Authorized: 8 of 20  FOTO Completion: Initial eval (4/4/2024)  FOTO #2: 5/7/24  FOTO #3:     Last Name: PARRIS   Access Code: [email or web address]     Time In: 8:37 am  Time Out: 9:30 am  Total Appointment Time (timed & untimed codes): 53 min     Precautions: Standard      Subjective     Patient reports: doing well  He was compliant with home exercise program given last session.   Response to previous treatment:   Functional change: increase functional use for light activity     Pain: 0/10  Location: left thumb      Objective     Objective Measures updated at progress report unless specified.  Mental status: alert, oriented x3     Observation/Appearance:   Mild swelling about Left thumb, scar about radial thumb thick with fair mobility     Sensation: Patient denies numbness/tingling        Edema:  (Measured circumferential, in centimeters)  Digits: Right  4/4/2024 Left  4/4/2024           Thumb:       Prox. Phalanx 6.8 7.0   IP 6.4 6.5                 ELBOW, WRIST RANGE OF MOTION:   Measured in degrees of active motion with goniometer     Right  4/4/2024 Left  4/4/2024   Elbow Extension/Flexion WNL WNL   Pronation/Supination WNL WNL   Wrist Extension/Flexion 80/55 75/50   Ulnar/Radial Deviation 35/15 20/20         Digit AROM:  Measured in degrees of active motion with goniometer and/or distance measured from finger tip to the distal palmar crease (DPC)    Right  4/4/2024 Left  4/4/2024 Left  5/7/24            Thumb: MP 0/32 0/18 0/30                IP 0/65 0/35 0/65       Rad ADD/ABD 45 40        Pal ADD/ABD 40 40        Opposition PIP crease SF Tip of SF PIP crease  Of SF         STRENGTH: (Measured in pounds using a Dynamometer and pinch meter)    Right  5/7/24 Left  5/7/24    Setting 2  70, 60, 75  25, 25, 25*    Average  63 lb  25 lb   Key  24   12   3 Pt 15   13   Tip  14 10          Intake Outcome Measure for FOTO Initial Evaluation Survey     Therapist reviewed FOTO scores for Henry Pham on 4/4/2024.   FOTO documents entered into [employer] - see Media section.     Intake Score: 61%           Treatment     Marin received the treatments listed below:      Supervised modalities after being cleared for contraindications: Fluidotherapy - 115 degrees and 50 speed, to Left hand for 10 minutes for increased blood flow and circulation, increased tissue extensibility, and pain management.      therapeutic exercises to develop ROM for 15 minutes including:  - Assessment of /pinch strength (NT)  - Pink putty  x 2 min   - Wrist curls, 3 ways, 3# (3 x 10)      therapeutic activities to improve functional performance for 28 minutes, including:  - In hand manip with coins x 1 container  - Thumb presser one yellow rubberband (20 reps)  - Yellow pin foam , 3 pt pinch and key pinch (1 container each) (red for 3pt pinch only.)  - Resistive gripper,20  #, repetitive peg  (1 container)  - isospheres x 2 min     Patient Education and Home Exercises     Education provided:   - cont HEP  - Progress towards goals     Written Home  Exercises Provided: yes. Putty tasks  Exercises were reviewed and Marin was able to demonstrate them prior to the end of the session.  Marin demonstrated good  understanding of the home exercise program provided. See electronic medical record under Patient Instructions for exercises provided during therapy sessions.       Assessment     Pt tolerated session well with no c/o pain today, however verbalized difficulty with lateral pinches. Will progress as able.     Marin is progressing well towards his goals and there are no updates to goals at this time. Pt prognosis is Good.     Patient will continue to benefit from skilled outpatient occupational therapy to address the deficits listed in the problem list on initial evaluation provide patient/family education and to maximize patient's level of independence in the home and community environment.     Patient's spiritual, cultural and educational needs considered and patient agreeable to plan of care and goals.    Anticipated barriers to occupational therapy: none     Goals:  Long Term Goals (to be met by discharge):  Date Goal Met:       1.) Henry Pham will demonstrate significantly improved functional performance from re-assessment as measured by a FOTO Intake score of more than 81.     Goal Status:   In progress     2.) Henry Pham will return to near to prior level of function for ADLs and household management reporting independence or modified independence.     Goal Status:   In progress     3. Henry Pham will report pain 2 out of 10 at worst to increase functional use of affected hand for work and leisure tasks.     Goal Status:   In progress      Short Term Goals (to be met by 5/2/24):  Date Goal Met:       Henry Pham will be independent with home exercise program with written instructions.     Goal Status:   In progress     Henry Pham will demonstrate touch to PIP crease of SF with thumb opposition to  improve functional performance in ADLs/work/leisure tasks.     Goal Status:   In progress     Henry Pham will demonstrate within 20 lbs of  strength compared to unaffected hand to improve functional grasp for ADLs/work/leisure tasks.     Goal Status:   In progress     Henry Pham will demonstrate the ability to complete ADL/IADL tasks with 4/10 pain.     Goal Status:   In progress     Henry Pham will be able to resume significantly greater occupational roles independently or modified as demonstrated by a FOTO Intake score of more than 71.     Goal Status:   In progress        Plan     Updates/Grading for next session: cont with protocol as tolerated     STEPHANIE De Dios   5/10/2024

## 2024-05-10 ENCOUNTER — CLINICAL SUPPORT (OUTPATIENT)
Dept: REHABILITATION | Facility: HOSPITAL | Age: 24
End: 2024-05-10
Payer: COMMERCIAL

## 2024-05-10 DIAGNOSIS — M25.642 DECREASED RANGE OF MOTION OF LEFT THUMB: ICD-10-CM

## 2024-05-10 DIAGNOSIS — R29.898 DECREASED GRIP STRENGTH OF LEFT HAND: ICD-10-CM

## 2024-05-10 DIAGNOSIS — M79.645 THUMB PAIN, LEFT: Primary | ICD-10-CM

## 2024-05-10 PROCEDURE — 97110 THERAPEUTIC EXERCISES: CPT | Mod: CO

## 2024-05-10 PROCEDURE — 97022 WHIRLPOOL THERAPY: CPT | Mod: CO

## 2024-05-10 PROCEDURE — 97530 THERAPEUTIC ACTIVITIES: CPT | Mod: CO

## 2024-05-14 ENCOUNTER — CLINICAL SUPPORT (OUTPATIENT)
Dept: REHABILITATION | Facility: HOSPITAL | Age: 24
End: 2024-05-14
Payer: COMMERCIAL

## 2024-05-14 ENCOUNTER — TELEPHONE (OUTPATIENT)
Dept: ORTHOPEDICS | Facility: CLINIC | Age: 24
End: 2024-05-14
Payer: COMMERCIAL

## 2024-05-14 DIAGNOSIS — R29.898 DECREASED GRIP STRENGTH OF LEFT HAND: ICD-10-CM

## 2024-05-14 DIAGNOSIS — M25.642 DECREASED RANGE OF MOTION OF LEFT THUMB: ICD-10-CM

## 2024-05-14 DIAGNOSIS — M79.645 THUMB PAIN, LEFT: Primary | ICD-10-CM

## 2024-05-14 PROCEDURE — 97140 MANUAL THERAPY 1/> REGIONS: CPT

## 2024-05-14 PROCEDURE — 97530 THERAPEUTIC ACTIVITIES: CPT

## 2024-05-14 PROCEDURE — 97110 THERAPEUTIC EXERCISES: CPT

## 2024-05-14 PROCEDURE — 97022 WHIRLPOOL THERAPY: CPT

## 2024-05-14 NOTE — PROGRESS NOTES
[facility]  Occupational Therapy Treatment Note     Date: 5/14/2024  Name: Henry Pham  Clinic Number: [id number]    Therapy Diagnosis:   Encounter Diagnoses   Name Primary?    Thumb pain, left Yes    Decreased  strength of left hand     Decreased range of motion of left thumb        Physician: Landry Joshi PA-C    Medical Diagnosis: Z98.890 (ICD-10-CM) - Post-operative state S63.642D (ICD-10-CM) - Rupture of radial collateral ligament of left thumb, subsequent encounter     Referring Physician: Landry Joshi PA-C  Physician Orders: Eval and Treat  Note:   L RCL Repair  DOS: 3/4/24  12 Visits  [facility]  Date of Return to MD: 5/27/24     Date of Injury: 2/11/24  Date of Surgery: 3/4/24  Left thumb RCL repair with Arthrex anchor     Evaluation Date: 4/4/2024  Authorization Period: 3/18/24 - 3/18/25  Plan of Care Expiration: 5/27/24  Visit #/ Visits Authorized: 10 of 20  FOTO Completion: Initial eval (4/4/2024)  FOTO #2: 5/7/24  FOTO #3:     Last Name: PARRIS   Access Code: [email or web address]     Time In: 8:40 am - pt arriving 10 min late  Time Out: 9:30 am  Total Appointment Time (timed & untimed codes): 50 min     Precautions: Standard      Subjective     Patient reports: doing well, no new complaints  He was compliant with home exercise program given last session.   Response to previous treatment: tolerated well  Functional change: increase functional use for light activity     Pain: 0/10  Location: left thumb      Objective     Objective Measures updated at progress report unless specified.  Mental status: alert, oriented x3     Observation/Appearance:   Mild swelling about Left thumb, scar about radial thumb thick with fair mobility     Sensation: Patient denies numbness/tingling        Edema:  (Measured circumferential, in centimeters)  Digits: Right  4/4/2024 Left  4/4/2024           Thumb:       Prox. Phalanx 6.8 7.0   IP 6.4 6.5                 ELBOW, WRIST RANGE OF MOTION:    Measured in degrees of active motion with goniometer    Right  4/4/2024 Left  4/4/2024   Elbow Extension/Flexion WNL WNL   Pronation/Supination WNL WNL   Wrist Extension/Flexion 80/55 75/50   Ulnar/Radial Deviation 35/15 20/20         Digit AROM:  Measured in degrees of active motion with goniometer and/or distance measured from finger tip to the distal palmar crease (DPC)    Right  4/4/2024 Left  4/4/2024 Left  5/7/24            Thumb: MP 0/32 0/18 0/30                IP 0/65 0/35 0/65       Rad ADD/ABD 45 40        Pal ADD/ABD 40 40        Opposition PIP crease SF Tip of SF PIP crease  Of SF         STRENGTH: (Measured in pounds using a Dynamometer and pinch meter)    Right  5/7/24 Left  5/7/24    Setting 2  70, 60, 75  25, 25, 25*    Average  63 lb  25 lb   Key  24   12   3 Pt 15   13   Tip  14 10          Intake Outcome Measure for FOTO Initial Evaluation Survey     Therapist reviewed FOTO scores for Henry Pham on 4/4/2024.   FOTO documents entered into [employer] - see Media section.     Intake Score: 61%           Treatment     Marin received the treatments listed below:      Supervised modalities after being cleared for contraindications: Fluidotherapy - 115 degrees and 50 speed, to Left hand for 10 minutes for increased blood flow and circulation, increased tissue extensibility, and pain management.      Marin received the following manual therapy techniques for 8 minutes:   - IASTM about dorsal thumb/hand and first web space  - Gentle PROM composite thumb flexion      therapeutic exercises to develop ROM for 8 minutes including:  - AROM pinky slide, palmar and radial abduction  - Pink putty  x 2 min (NT)  - Wrist curls, 3 ways, 3# (3 x 10)      therapeutic activities to improve functional performance for 24 minutes, including:  - In hand manip with coins x 1 container  - Thumb presser one yellow rubberband (20 reps)  - Yellow pin foam , 3 pt pinch and key pinch (2 sets each)  (red for 3pt pinch only)  - Resistive gripper, 23#, repetitive peg  (1 container)  - isospheres x 3 min       Patient Education and Home Exercises     Education provided:   - cont HEP  - Progress towards goals     Written Home Exercises Provided: yes. Putty tasks  Exercises were reviewed and Marin was able to demonstrate them prior to the end of the session.  Marin demonstrated good  understanding of the home exercise program provided. See electronic medical record under Patient Instructions for exercises provided during therapy sessions.       Assessment     Pt tolerated session well with no c/o pain today, however verbalized difficulty with lateral pinches. Will progress as able.     Marin is progressing well towards his goals and there are no updates to goals at this time. Pt prognosis is Good.     Patient will continue to benefit from skilled outpatient occupational therapy to address the deficits listed in the problem list on initial evaluation provide patient/family education and to maximize patient's level of independence in the home and community environment.     Patient's spiritual, cultural and educational needs considered and patient agreeable to plan of care and goals.    Anticipated barriers to occupational therapy: none     Goals:  Long Term Goals (to be met by discharge):  Date Goal Met:       1.) Henry Pham will demonstrate significantly improved functional performance from re-assessment as measured by a FOTO Intake score of more than 81.     Goal Status:   In progress     2.) Henry Pham will return to near to prior level of function for ADLs and household management reporting independence or modified independence.     Goal Status:   In progress     3. Henry Pham will report pain 2 out of 10 at worst to increase functional use of affected hand for work and leisure tasks.     Goal Status:   In progress      Short Term Goals (to be met by 5/2/24):  Date  Goal Met:       Henry Pham will be independent with home exercise program with written instructions.     Goal Status:   In progress     Henry Pham will demonstrate touch to PIP crease of SF with thumb opposition to improve functional performance in ADLs/work/leisure tasks.     Goal Status:   In progress     Henry Pham will demonstrate within 20 lbs of  strength compared to unaffected hand to improve functional grasp for ADLs/work/leisure tasks.     Goal Status:   In progress     Henry Pham will demonstrate the ability to complete ADL/IADL tasks with 4/10 pain.     Goal Status:   In progress     Henry Pham will be able to resume significantly greater occupational roles independently or modified as demonstrated by a FOTO Intake score of more than 71.     Goal Status:   In progress        Plan     Updates/Grading for next session: cont with protocol as tolerated     Bethanie Sheridan OT   5/14/2024

## 2024-05-16 ENCOUNTER — OFFICE VISIT (OUTPATIENT)
Dept: ORTHOPEDICS | Facility: CLINIC | Age: 24
End: 2024-05-16
Payer: COMMERCIAL

## 2024-05-16 VITALS — WEIGHT: 248 LBS | HEIGHT: 71 IN | BODY MASS INDEX: 34.72 KG/M2

## 2024-05-16 DIAGNOSIS — S63.642D RUPTURE OF RADIAL COLLATERAL LIGAMENT OF LEFT THUMB, SUBSEQUENT ENCOUNTER: ICD-10-CM

## 2024-05-16 DIAGNOSIS — Z98.890 POST-OPERATIVE STATE: Primary | ICD-10-CM

## 2024-05-16 PROCEDURE — 99999 PR PBB SHADOW E&M-EST. PATIENT-LVL III: CPT | Mod: PBBFAC,,, | Performed by: SPECIALIST/TECHNOLOGIST

## 2024-05-16 PROCEDURE — 1159F MED LIST DOCD IN RCRD: CPT | Mod: CPTII,S$GLB,, | Performed by: SPECIALIST/TECHNOLOGIST

## 2024-05-16 PROCEDURE — 3044F HG A1C LEVEL LT 7.0%: CPT | Mod: CPTII,S$GLB,, | Performed by: SPECIALIST/TECHNOLOGIST

## 2024-05-16 PROCEDURE — 99024 POSTOP FOLLOW-UP VISIT: CPT | Mod: S$GLB,,, | Performed by: SPECIALIST/TECHNOLOGIST

## 2024-05-16 NOTE — PROGRESS NOTES
"5/15/24  Patient reports 10 weeks status post left radial collateral ligament repair.  He states that he is doing very well in an minimal pain.  He is returned back to normal activity without any issues.     4/4/24  Patient reports 4 weeks supposed left radial collateral ligament repair.  He has been in a fiberglass cast for the past 2 weeks.  States he is in minimal pain.  He states he is scheduled to begin therapy today.    3/18/24  Mr. Pham is here today for a post-operative visit.  He is 14 days status post Left Radial Collateral Ligament Repair of thumb by Dr. Smart on 3/4/24. He reports that he is minimal pain. Denies taking any pain medication.  He denies fever, chills, and sweats since the time of the surgery.     Physical exam:    Vitals:    05/16/24 1053   Weight: 112.5 kg (248 lb 0.3 oz)   Height: 5' 11" (1.803 m)   PainSc: 0-No pain       Vital signs are stable, patient is afebrile.  Patient is well dressed and well groomed, no acute distress.  Alert and oriented to person, place, and time.  Post op dressing taken down.  Incision is clean, dry and intact.  There is no erythema or exudate.  There is no sign of any infection. He is NVI. Sutures removed without difficulty. Able to make a composite fist. Opposition to base of small finger    Assessment:  s/p Left Radial Collateral Ligament Repair of thumb       Plan:  Henry Brownlee" was seen today for follow-up.    Diagnoses and all orders for this visit:    Post-operative state    Rupture of radial collateral ligament of left thumb, subsequent encounter          Continue with regular home exercise program and therapy  Patient no longer needs to be wearing the thumb spica brace  Patient we will follow up p.rgissell Joshi PA-C, [facility]  [facility]  [facility] [place], [place]    Disclaimer: This note has been generated using voice-recognition software. There may be typographical errors that have been missed during proof-reading.   "

## 2024-05-21 ENCOUNTER — CLINICAL SUPPORT (OUTPATIENT)
Dept: REHABILITATION | Facility: HOSPITAL | Age: 24
End: 2024-05-21
Payer: COMMERCIAL

## 2024-05-21 DIAGNOSIS — M25.642 DECREASED RANGE OF MOTION OF LEFT THUMB: ICD-10-CM

## 2024-05-21 DIAGNOSIS — M79.645 THUMB PAIN, LEFT: Primary | ICD-10-CM

## 2024-05-21 DIAGNOSIS — R29.898 DECREASED GRIP STRENGTH OF LEFT HAND: ICD-10-CM

## 2024-05-21 PROCEDURE — 97530 THERAPEUTIC ACTIVITIES: CPT

## 2024-05-21 PROCEDURE — 97110 THERAPEUTIC EXERCISES: CPT

## 2024-05-21 PROCEDURE — 97022 WHIRLPOOL THERAPY: CPT

## 2024-05-21 NOTE — PROGRESS NOTES
[facility]  Occupational Therapy Treatment Note     Date: 5/21/2024  Name: Henry Pham  Clinic Number: [id number]    Therapy Diagnosis:   Encounter Diagnoses   Name Primary?    Thumb pain, left Yes    Decreased  strength of left hand     Decreased range of motion of left thumb        Physician: Landry Joshi PA-C    Medical Diagnosis: Z98.890 (ICD-10-CM) - Post-operative state S63.642D (ICD-10-CM) - Rupture of radial collateral ligament of left thumb, subsequent encounter     Referring Physician: Landry Joshi PA-C  Physician Orders: Eval and Treat  Note:   L RCL Repair  DOS: 3/4/24  12 Visits  [facility]  Date of Return to MD: PRN     Date of Injury: 2/11/24  Date of Surgery: 3/4/24  Left thumb RCL repair with Arthrex anchor     Evaluation Date: 4/4/2024  Authorization Period: 3/18/24 - 3/18/25  Plan of Care Expiration: 5/27/24  Visit #/ Visits Authorized: 11 of 20  FOTO Completion: Initial eval (4/4/2024)  FOTO #2: 5/7/24  FOTO #3:     Last Name: PARRIS   Access Code: [email or web address]     Time In: 4:00 pm  Time Out: 4:50 pm  Total Appointment Time (timed & untimed codes): 50 min     Precautions: Standard      Subjective     Patient reports: doing well, no new complaints  He was compliant with home exercise program given last session.   Response to previous treatment: tolerated well  Functional change: increase functional use for light activity     Pain: 0/10  Location: left thumb      Objective     Objective Measures updated at progress report unless specified.  Mental status: alert, oriented x3     Observation/Appearance:   Mild swelling about Left thumb, scar about radial thumb thick with fair mobility     Sensation: Patient denies numbness/tingling        Edema:  (Measured circumferential, in centimeters)  Digits: Right  4/4/2024 Left  4/4/2024           Thumb:       Prox. Phalanx 6.8 7.0   IP 6.4 6.5                 ELBOW, WRIST RANGE OF MOTION:   Measured in degrees of active  motion with goniometer    Right  4/4/2024 Left  4/4/2024   Elbow Extension/Flexion WNL WNL   Pronation/Supination WNL WNL   Wrist Extension/Flexion 80/55 75/50   Ulnar/Radial Deviation 35/15 20/20         Digit AROM:  Measured in degrees of active motion with goniometer and/or distance measured from finger tip to the distal palmar crease (DPC)    Right  4/4/2024 Left  4/4/2024 Left  5/7/24            Thumb: MP 0/32 0/18 0/30                IP 0/65 0/35 0/65       Rad ADD/ABD 45 40        Pal ADD/ABD 40 40        Opposition PIP crease SF Tip of SF PIP crease  Of SF         STRENGTH: (Measured in pounds using a Dynamometer and pinch meter)    Right  5/7/24 Left  5/7/24 Left  5/21/24    Setting 2  70, 60, 75  25, 25, 25* 70, 70, 65    Average  63 lb  25 lb 68 lb   Key  24   12 17   3 Pt 15   13 16   Tip  14 10  14         Intake Outcome Measure for FOTO Initial Evaluation Survey     Therapist reviewed FOTO scores for Henry Pham on 4/4/2024.   FOTO documents entered into [employer] - see Media section.     Intake Score: 61%           Treatment     Marin received the treatments listed below:      Supervised modalities after being cleared for contraindications: Fluidotherapy - 115 degrees and 50 speed, to Left hand for 10 minutes for increased blood flow and circulation, increased tissue extensibility, and pain management.      therapeutic exercises to develop ROM for 10 minutes including:  - Reassessment objective measurements  - AROM pinky slide, palmar and radial abduction  - Pink putty  x 2 min (NT)  - Wrist curls, 3 ways, 4# (3 x 10)      therapeutic activities to improve functional performance for 30 minutes, including:  - In hand manip with coins x 1 container  - Red pin foam , 3 pt pinch and key pinch (2 sets each)  - Resistive gripper, 55#, repetitive peg  (2 containers)  - isospheres x 3 min   - BTE tool 400 and 701 to simulate leisure tasks      Patient Education and Home  Exercises     Education provided:   - cont HEP  - Progress towards goals     Written Home Exercises Provided: Patient instructed to cont prior HEP.   Exercises were reviewed and Marin was able to demonstrate them prior to the end of the session.  Marin demonstrated good  understanding of the home exercise program provided. See electronic medical record under Patient Instructions for exercises provided during therapy sessions.       Assessment     Marin tolerated all treatment tasks well, meeting all STG/LTG with exception of FOTO - although he is reporting near return to full prior level of function. His ROM and /pinch are WNL. He demonstrates independence with HEP and plan to progress independently at home. He is discharged from OT and will call with any questions or concerns.    Patient's spiritual, cultural and educational needs considered and patient agreeable to plan of care and goals.    Anticipated barriers to occupational therapy: none     Goals:  Long Term Goals (to be met by discharge):  Date Goal Met:       1.) Henry Pham will demonstrate significantly improved functional performance from re-assessment as measured by a FOTO Intake score of more than 81.     Goal Status:   In progress     2.) Henry Pham will return to near to prior level of function for ADLs and household management reporting independence or modified independence.     Goal Status:  Met     3. Henry Pham will report pain 2 out of 10 at worst to increase functional use of affected hand for work and leisure tasks.     Goal Status:  Met      Short Term Goals (to be met by 5/2/24):  Date Goal Met:       Henry Pham will be independent with home exercise program with written instructions.     Goal Status:  Met     Henry Pham will demonstrate touch to PIP crease of SF with thumb opposition to improve functional performance in ADLs/work/leisure tasks.     Goal Status:  Met     Henry  Jennifer Pham will demonstrate within 20 lbs of  strength compared to unaffected hand to improve functional grasp for ADLs/work/leisure tasks.     Goal Status:  Met     Henry Pham will demonstrate the ability to complete ADL/IADL tasks with 4/10 pain.     Goal Status:  Met     Henry Pham will be able to resume significantly greater occupational roles independently or modified as demonstrated by a FOTO Intake score of more than 71.     Goal Status:   In progress        Plan     Discharged from OT and continue independently with SCOOBY Sheridan OT   5/21/2024

## 2024-10-01 ENCOUNTER — PATIENT MESSAGE (OUTPATIENT)
Dept: INTERNAL MEDICINE | Facility: CLINIC | Age: 24
End: 2024-10-01
Payer: COMMERCIAL

## 2024-11-13 ENCOUNTER — PATIENT MESSAGE (OUTPATIENT)
Dept: RESEARCH | Facility: HOSPITAL | Age: 24
End: 2024-11-13
Payer: COMMERCIAL

## 2025-02-07 ENCOUNTER — OFFICE VISIT (OUTPATIENT)
Dept: INTERNAL MEDICINE | Facility: CLINIC | Age: 25
End: 2025-02-07
Payer: COMMERCIAL

## 2025-02-07 ENCOUNTER — CLINICAL SUPPORT (OUTPATIENT)
Dept: OTHER | Facility: CLINIC | Age: 25
End: 2025-02-07

## 2025-02-07 ENCOUNTER — LAB VISIT (OUTPATIENT)
Dept: LAB | Facility: HOSPITAL | Age: 25
End: 2025-02-07
Attending: NURSE PRACTITIONER
Payer: COMMERCIAL

## 2025-02-07 VITALS
HEIGHT: 71 IN | RESPIRATION RATE: 16 BRPM | OXYGEN SATURATION: 99 % | HEART RATE: 74 BPM | SYSTOLIC BLOOD PRESSURE: 118 MMHG | DIASTOLIC BLOOD PRESSURE: 68 MMHG | BODY MASS INDEX: 35.7 KG/M2 | TEMPERATURE: 98 F | WEIGHT: 255 LBS

## 2025-02-07 DIAGNOSIS — E66.9 OBESITY (BMI 35.0-39.9 WITHOUT COMORBIDITY): ICD-10-CM

## 2025-02-07 DIAGNOSIS — J04.0 LARYNGITIS: ICD-10-CM

## 2025-02-07 DIAGNOSIS — Z00.8 ENCOUNTER FOR OTHER GENERAL EXAMINATION: ICD-10-CM

## 2025-02-07 DIAGNOSIS — Z00.00 ANNUAL PHYSICAL EXAM: Primary | ICD-10-CM

## 2025-02-07 DIAGNOSIS — E03.9 HYPOTHYROIDISM, UNSPECIFIED TYPE: ICD-10-CM

## 2025-02-07 DIAGNOSIS — Z00.00 ANNUAL PHYSICAL EXAM: ICD-10-CM

## 2025-02-07 LAB
ALBUMIN SERPL BCP-MCNC: 4.2 G/DL (ref 3.5–5.2)
ALP SERPL-CCNC: 83 U/L (ref 40–150)
ALT SERPL W/O P-5'-P-CCNC: 41 U/L (ref 10–44)
ANION GAP SERPL CALC-SCNC: 11 MMOL/L (ref 8–16)
AST SERPL-CCNC: 24 U/L (ref 10–40)
BASOPHILS # BLD AUTO: 0.07 K/UL (ref 0–0.2)
BASOPHILS NFR BLD: 0.6 % (ref 0–1.9)
BILIRUB SERPL-MCNC: 0.5 MG/DL (ref 0.1–1)
BUN SERPL-MCNC: 13 MG/DL (ref 6–20)
CALCIUM SERPL-MCNC: 9.7 MG/DL (ref 8.7–10.5)
CHLORIDE SERPL-SCNC: 106 MMOL/L (ref 95–110)
CO2 SERPL-SCNC: 22 MMOL/L (ref 23–29)
CREAT SERPL-MCNC: 1 MG/DL (ref 0.5–1.4)
DIFFERENTIAL METHOD BLD: ABNORMAL
EOSINOPHIL # BLD AUTO: 0.1 K/UL (ref 0–0.5)
EOSINOPHIL NFR BLD: 1.2 % (ref 0–8)
ERYTHROCYTE [DISTWIDTH] IN BLOOD BY AUTOMATED COUNT: 12.3 % (ref 11.5–14.5)
EST. GFR  (NO RACE VARIABLE): >60 ML/MIN/1.73 M^2
ESTIMATED AVG GLUCOSE: 108 MG/DL (ref 68–131)
GLUCOSE SERPL-MCNC: 87 MG/DL (ref 70–110)
HBA1C MFR BLD: 5.4 % (ref 4–5.6)
HCT VFR BLD AUTO: 48.1 % (ref 40–54)
HGB BLD-MCNC: 15.4 G/DL (ref 14–18)
IMM GRANULOCYTES # BLD AUTO: 0.06 K/UL (ref 0–0.04)
IMM GRANULOCYTES NFR BLD AUTO: 0.5 % (ref 0–0.5)
LYMPHOCYTES # BLD AUTO: 3.5 K/UL (ref 1–4.8)
LYMPHOCYTES NFR BLD: 29.7 % (ref 18–48)
MCH RBC QN AUTO: 27.4 PG (ref 27–31)
MCHC RBC AUTO-ENTMCNC: 32 G/DL (ref 32–36)
MCV RBC AUTO: 85 FL (ref 82–98)
MONOCYTES # BLD AUTO: 1.1 K/UL (ref 0.3–1)
MONOCYTES NFR BLD: 9.3 % (ref 4–15)
NEUTROPHILS # BLD AUTO: 6.8 K/UL (ref 1.8–7.7)
NEUTROPHILS NFR BLD: 58.7 % (ref 38–73)
NRBC BLD-RTO: 0 /100 WBC
PLATELET # BLD AUTO: 266 K/UL (ref 150–450)
PMV BLD AUTO: 12.2 FL (ref 9.2–12.9)
POTASSIUM SERPL-SCNC: 4.1 MMOL/L (ref 3.5–5.1)
PROT SERPL-MCNC: 7.5 G/DL (ref 6–8.4)
RBC # BLD AUTO: 5.63 M/UL (ref 4.6–6.2)
SODIUM SERPL-SCNC: 139 MMOL/L (ref 136–145)
TSH SERPL DL<=0.005 MIU/L-ACNC: 3.34 UIU/ML (ref 0.4–4)
WBC # BLD AUTO: 11.67 K/UL (ref 3.9–12.7)

## 2025-02-07 PROCEDURE — 84443 ASSAY THYROID STIM HORMONE: CPT | Performed by: NURSE PRACTITIONER

## 2025-02-07 PROCEDURE — 1159F MED LIST DOCD IN RCRD: CPT | Mod: CPTII,S$GLB,, | Performed by: NURSE PRACTITIONER

## 2025-02-07 PROCEDURE — 3008F BODY MASS INDEX DOCD: CPT | Mod: CPTII,S$GLB,, | Performed by: NURSE PRACTITIONER

## 2025-02-07 PROCEDURE — 85025 COMPLETE CBC W/AUTO DIFF WBC: CPT | Performed by: NURSE PRACTITIONER

## 2025-02-07 PROCEDURE — 3078F DIAST BP <80 MM HG: CPT | Mod: CPTII,S$GLB,, | Performed by: NURSE PRACTITIONER

## 2025-02-07 PROCEDURE — 83036 HEMOGLOBIN GLYCOSYLATED A1C: CPT | Performed by: NURSE PRACTITIONER

## 2025-02-07 PROCEDURE — 1160F RVW MEDS BY RX/DR IN RCRD: CPT | Mod: CPTII,S$GLB,, | Performed by: NURSE PRACTITIONER

## 2025-02-07 PROCEDURE — 80053 COMPREHEN METABOLIC PANEL: CPT | Performed by: NURSE PRACTITIONER

## 2025-02-07 PROCEDURE — 99999 PR PBB SHADOW E&M-EST. PATIENT-LVL IV: CPT | Mod: PBBFAC,,, | Performed by: NURSE PRACTITIONER

## 2025-02-07 PROCEDURE — 3074F SYST BP LT 130 MM HG: CPT | Mod: CPTII,S$GLB,, | Performed by: NURSE PRACTITIONER

## 2025-02-07 PROCEDURE — 96372 THER/PROPH/DIAG INJ SC/IM: CPT | Mod: S$GLB,,, | Performed by: NURSE PRACTITIONER

## 2025-02-07 PROCEDURE — 99395 PREV VISIT EST AGE 18-39: CPT | Mod: 25,S$GLB,, | Performed by: NURSE PRACTITIONER

## 2025-02-07 RX ORDER — TRIAMCINOLONE ACETONIDE 40 MG/ML
40 INJECTION, SUSPENSION INTRA-ARTICULAR; INTRAMUSCULAR
Status: COMPLETED | OUTPATIENT
Start: 2025-02-07 | End: 2025-02-07

## 2025-02-07 RX ADMIN — TRIAMCINOLONE ACETONIDE 40 MG: 40 INJECTION, SUSPENSION INTRA-ARTICULAR; INTRAMUSCULAR at 09:02

## 2025-02-07 NOTE — PROGRESS NOTES
Subjective:       Patient ID: Henry Pham is a 24 y.o. male.    Chief Complaint: Annual Exam    History of Present Illness    CHIEF COMPLAINT:  Patient presents today for annual exam with persistent cough    HISTORY OF PRESENT ILLNESS:  He presents with persistent cough that started the week before [place]. The cough worsens during evening and nighttime hours with associated intermittent voice loss and increased phlegm production. Multiple medications have been tried including inhaler, Benzonatate, Mucinex DM, and Azithromycin without improvement.    MEDICAL HISTORY:  He has a history of thumb injury from fall requiring surgical intervention with ligament repair and pin placement. Physical therapy was completed until end of June. He also reports recent ankle injury with severe sprain resulting in 6 weeks of limited mobility.    MEDICATIONS:  He continues thyroid medication with reported improvement in general fatigue symptoms.    ROS:  Constitutional: -fatigue  Respiratory: +cough  Musculoskeletal: -joint pain     Objective:      Physical Exam  Vitals reviewed.   Constitutional:       Appearance: Normal appearance. He is obese.   HENT:      Head: Normocephalic and atraumatic.      Nose: Nose normal.      Mouth/Throat:      Mouth: Mucous membranes are moist.   Eyes:      Pupils: Pupils are equal, round, and reactive to light.   Cardiovascular:      Rate and Rhythm: Normal rate and regular rhythm.      Heart sounds: Normal heart sounds.   Pulmonary:      Effort: Pulmonary effort is normal.      Breath sounds: Normal breath sounds.   Abdominal:      General: Abdomen is flat. Bowel sounds are normal.      Palpations: Abdomen is soft.   Musculoskeletal:         General: Normal range of motion.      Cervical back: Normal range of motion.   Skin:     General: Skin is warm and dry.   Neurological:      General: No focal deficit present.      Mental Status: He is alert and oriented to person, place, and time.    Psychiatric:         Mood and Affect: Mood normal.         Behavior: Behavior normal.         Assessment:       1. Annual physical exam  -Recommend completing fasting labs. Results will be communicated via patient portal when available.    - CBC auto differential; Future  - Comprehensive metabolic panel; Future  - Hemoglobin A1c; Future  - TSH; Future    2. Laryngitis  - triamcinolone acetonide injection 40 mg    3.Hypothyroidism  -Repeat Tsh completed in office today.   -Changes will be made as warranted.     4.Morbid Obesity  -Pt plans on resuming  gym activities to help with wt loss. He has been on a hiatus due ot recent ankle and thumb injuries.       Plan:       Assessment & Plan    IMPRESSION:  Suspected laryngitis due to lingering cough since December, likely post-viral  Considered reflux as potential cause, but patient denies symptoms  Decided on steroid injection to reduce inflammation, as previous treatments were ineffective  Recommend antihistamine trial to address potential allergic component  Suggested OTC Pepcid as future option if other treatments ineffective, to rule out silent reflux  Reviewed recent biometric screening results, determined no need for cholesterol panel    PERSISTENT COUGH AND LARYNGITIS:  - Evaluated the patient's persistent cough, present for approximately 1.5 months, worsening at night and in the evening.  - Noted associated voice loss and increased phlegm production.  - Assessed the condition as likely laryngitis, possibly precipitated by a viral infection.  - Considered various potential causes for the persistent cough, including reflux and allergies.  - Recommended Zyrtec (antihistamine) to be taken daily.  - Instructed the patient to try warm honey, preferably in tea, to soothe the throat.  - Advised to start OTC Pepcid if Zyrtec proves ineffective after a few weeks.  - Noted previous unsuccessful treatments with inhaler, Benzonatate, Mucinex DM, and Azithromycin.    THYROID  DISORDER:  - Noted that the patient is currently on thyroid medication.  - Evaluated the patient's response to medication, noting improvement in general fatigue.  - Ordered a comprehensive thyroid panel, including thyroid test, blood count, and metabolic panel.  - Scheduled follow-up to review thyroid levels and adjust treatment as necessary.     This note was generated with the assistance of ambient listening technology. Verbal consent was obtained by the patient and accompanying visitor(s) for the recording of patient appointment to facilitate this note. I attest to having reviewed and edited the generated note for accuracy, though some syntax or spelling errors may persist. Please contact the author of this note for any clarification.       Answers submitted by the patient for this visit:  Review of Systems Questionnaire (Submitted on 1/27/2025)  activity change: Yes  unexpected weight change: No  neck pain: No  hearing loss: No  rhinorrhea: No  trouble swallowing: No  eye discharge: No  visual disturbance: No  chest tightness: No  wheezing: No  chest pain: No  palpitations: No  blood in stool: No  constipation: No  vomiting: No  diarrhea: No  polydipsia: No  polyuria: No  difficulty urinating: No  urgency: No  hematuria: No  joint swelling: No  arthralgias: No  headaches: No  weakness: No  confusion: No  dysphoric mood: No

## 2025-02-08 VITALS
BODY MASS INDEX: 36.51 KG/M2 | DIASTOLIC BLOOD PRESSURE: 94 MMHG | HEIGHT: 70 IN | SYSTOLIC BLOOD PRESSURE: 126 MMHG | WEIGHT: 255 LBS

## 2025-02-08 LAB
HDLC SERPL-MCNC: 30 MG/DL
POC CHOLESTEROL, LDL (DOCK): 88 MG/DL
POC CHOLESTEROL, TOTAL: 146 MG/DL
POC GLUCOSE, FASTING: 84 MG/DL (ref 60–110)
TRIGL SERPL-MCNC: 157 MG/DL

## 2025-02-10 DIAGNOSIS — E03.9 HYPOTHYROIDISM, UNSPECIFIED TYPE: ICD-10-CM

## 2025-02-10 RX ORDER — LEVOTHYROXINE SODIUM 112 UG/1
112 TABLET ORAL
Qty: 90 TABLET | Refills: 3 | Status: SHIPPED | OUTPATIENT
Start: 2025-05-01

## 2025-05-19 ENCOUNTER — OFFICE VISIT (OUTPATIENT)
Dept: OPTOMETRY | Facility: CLINIC | Age: 25
End: 2025-05-19
Payer: COMMERCIAL

## 2025-05-19 DIAGNOSIS — H52.13 MYOPIA OF BOTH EYES: Primary | ICD-10-CM

## 2025-05-19 DIAGNOSIS — Z46.0 FITTING AND ADJUSTMENT OF SPECTACLES AND CONTACT LENSES: Primary | ICD-10-CM

## 2025-05-19 DIAGNOSIS — Z97.3 WEARS CONTACT LENSES: ICD-10-CM

## 2025-05-19 PROCEDURE — 1159F MED LIST DOCD IN RCRD: CPT | Mod: CPTII,S$GLB,, | Performed by: OPTOMETRIST

## 2025-05-19 PROCEDURE — 92015 DETERMINE REFRACTIVE STATE: CPT | Mod: S$GLB,,, | Performed by: OPTOMETRIST

## 2025-05-19 PROCEDURE — 92310 CONTACT LENS FITTING OU: CPT | Mod: CSM,,, | Performed by: OPTOMETRIST

## 2025-05-19 PROCEDURE — 99999 PR PBB SHADOW E&M-EST. PATIENT-LVL II: CPT | Mod: PBBFAC,,, | Performed by: OPTOMETRIST

## 2025-05-19 PROCEDURE — 3044F HG A1C LEVEL LT 7.0%: CPT | Mod: CPTII,S$GLB,, | Performed by: OPTOMETRIST

## 2025-05-19 PROCEDURE — 1160F RVW MEDS BY RX/DR IN RCRD: CPT | Mod: CPTII,S$GLB,, | Performed by: OPTOMETRIST

## 2025-05-19 PROCEDURE — 92014 COMPRE OPH EXAM EST PT 1/>: CPT | Mod: S$GLB,,, | Performed by: OPTOMETRIST

## 2025-05-19 NOTE — PROGRESS NOTES
HPI    Pt here for routine w no problems.  Wishes to update CLRx  Last edited by Tao Li, OD on 5/19/2025  3:08 PM.            Assessment /Plan     For exam results, see Encounter Report.    Myopia of both eyes    Wears contact lenses      Pt wearing AV 1-day moist, which may be discontinued.  Refit into [employer]--good fit/[facility]    PLAN:    DISP trials--5 pairs  Continue Daily Wear schedule.  NO SLEEPING IN CONTACT LENSES. Exchange daily  If no problems will call for CLRx, rtc 1 yr

## 2025-06-18 ENCOUNTER — PATIENT MESSAGE (OUTPATIENT)
Dept: OPTOMETRY | Facility: CLINIC | Age: 25
End: 2025-06-18
Payer: COMMERCIAL

## (undated) DEVICE — SOL IRR SOD CHL .9% POUR

## (undated) DEVICE — SLING ARM X-LARGE FOAM STRAP

## (undated) DEVICE — SCRUB 10% POVIDONE IODINE 4OZ

## (undated) DEVICE — PAD CAST SPECIALIST 2X4

## (undated) DEVICE — COVER LIGHT HANDLE 80/CA

## (undated) DEVICE — GAUZE SPONGE 4X4 12PLY

## (undated) DEVICE — BANDAGE MATRIX HK LOOP 2IN 5YD

## (undated) DEVICE — SUT 4-0 VICRYL / P-3

## (undated) DEVICE — GLOVE BIOGEL SKINSENSE PI 7.0

## (undated) DEVICE — CORD FOR BIPOLAR FORCEPS 12

## (undated) DEVICE — Device

## (undated) DEVICE — PLASTER SPLNT FST SET 4X15 BLU

## (undated) DEVICE — SUT MONOCRYL PLUS 4-0 P3

## (undated) DEVICE — BUCKET PLASTER DISPOSABLE

## (undated) DEVICE — PAD CAST SPECIALIST STRL 4

## (undated) DEVICE — DRESSING N ADH OIL EMUL 3X3

## (undated) DEVICE — DRAPE SURG W/TWL 17 5/8X23

## (undated) DEVICE — GLOVE BIOGEL PI MICRO SZ 7

## (undated) DEVICE — DRAPE C-ARM MINI DISP

## (undated) DEVICE — TOURNIQUET SB QC DP 18X4IN

## (undated) DEVICE — COVER CAMERA OPERATING ROOM

## (undated) DEVICE — ADHESIVE DERMABOND ADVANCED